# Patient Record
Sex: FEMALE | Race: WHITE | NOT HISPANIC OR LATINO | Employment: FULL TIME | URBAN - METROPOLITAN AREA
[De-identification: names, ages, dates, MRNs, and addresses within clinical notes are randomized per-mention and may not be internally consistent; named-entity substitution may affect disease eponyms.]

---

## 2017-01-06 ENCOUNTER — HISTORICAL (OUTPATIENT)
Dept: INFUSION THERAPY | Facility: HOSPITAL | Age: 37
End: 2017-01-06

## 2017-02-03 ENCOUNTER — HISTORICAL (OUTPATIENT)
Dept: INFUSION THERAPY | Facility: HOSPITAL | Age: 37
End: 2017-02-03

## 2017-03-06 ENCOUNTER — HISTORICAL (OUTPATIENT)
Dept: INFUSION THERAPY | Facility: HOSPITAL | Age: 37
End: 2017-03-06

## 2017-04-03 ENCOUNTER — HISTORICAL (OUTPATIENT)
Dept: INFUSION THERAPY | Facility: HOSPITAL | Age: 37
End: 2017-04-03

## 2017-05-04 ENCOUNTER — HISTORICAL (OUTPATIENT)
Dept: INFUSION THERAPY | Facility: HOSPITAL | Age: 37
End: 2017-05-04

## 2017-05-12 ENCOUNTER — HISTORICAL (OUTPATIENT)
Dept: HEMATOLOGY/ONCOLOGY | Facility: CLINIC | Age: 37
End: 2017-05-12

## 2017-05-12 LAB
ABS NEUT (OLG): 2.45 X10(3)/MCL (ref 2.1–9.2)
ALBUMIN SERPL-MCNC: 4.3 GM/DL (ref 3.4–5)
ALBUMIN/GLOB SERPL: 1.3 {RATIO}
ALP SERPL-CCNC: 111 UNIT/L (ref 38–126)
ALT SERPL-CCNC: 38 UNIT/L (ref 12–78)
AST SERPL-CCNC: 14 UNIT/L (ref 15–37)
BASOPHILS # BLD AUTO: 0 X10(3)/MCL (ref 0–0.2)
BASOPHILS NFR BLD AUTO: 0.5 %
BILIRUB SERPL-MCNC: 0.3 MG/DL (ref 0.2–1)
BILIRUBIN DIRECT+TOT PNL SERPL-MCNC: 0.1 MG/DL (ref 0–0.2)
BILIRUBIN DIRECT+TOT PNL SERPL-MCNC: 0.2 MG/DL (ref 0–0.8)
BUN SERPL-MCNC: 11 MG/DL (ref 7–18)
CALCIUM SERPL-MCNC: 9.8 MG/DL (ref 8.5–10.1)
CHLORIDE SERPL-SCNC: 103 MMOL/L (ref 98–107)
CO2 SERPL-SCNC: 28 MMOL/L (ref 21–32)
CREAT SERPL-MCNC: 0.59 MG/DL (ref 0.55–1.02)
EOSINOPHIL # BLD AUTO: 0.1 X10(3)/MCL (ref 0–0.9)
EOSINOPHIL NFR BLD AUTO: 1.4 %
ERYTHROCYTE [DISTWIDTH] IN BLOOD BY AUTOMATED COUNT: 12.5 % (ref 11.5–17)
GLOBULIN SER-MCNC: 3.2 GM/DL (ref 2.4–3.5)
GLUCOSE SERPL-MCNC: 89 MG/DL (ref 74–106)
HCT VFR BLD AUTO: 38.5 % (ref 37–47)
HGB BLD-MCNC: 13 GM/DL (ref 12–16)
LYMPHOCYTES # BLD AUTO: 1.3 X10(3)/MCL (ref 0.6–4.6)
LYMPHOCYTES NFR BLD AUTO: 29 %
MCH RBC QN AUTO: 29.7 PG (ref 27–31)
MCHC RBC AUTO-ENTMCNC: 33.8 GM/DL (ref 33–36)
MCV RBC AUTO: 87.9 FL (ref 80–94)
MONOCYTES # BLD AUTO: 0.6 X10(3)/MCL (ref 0.1–1.3)
MONOCYTES NFR BLD AUTO: 12.9 %
NEUTROPHILS # BLD AUTO: 2.4 X10(3)/MCL (ref 2.1–9.2)
NEUTROPHILS NFR BLD AUTO: 56.2 %
PLATELET # BLD AUTO: 249 X10(3)/MCL (ref 130–400)
PMV BLD AUTO: 9.8 FL (ref 9.4–12.4)
POTASSIUM SERPL-SCNC: 4 MMOL/L (ref 3.5–5.1)
PROT SERPL-MCNC: 7.5 GM/DL (ref 6.4–8.2)
RBC # BLD AUTO: 4.38 X10(6)/MCL (ref 4.2–5.4)
SODIUM SERPL-SCNC: 140 MMOL/L (ref 136–145)
WBC # SPEC AUTO: 4.4 X10(3)/MCL (ref 4.5–11.5)

## 2017-05-31 ENCOUNTER — HISTORICAL (OUTPATIENT)
Dept: INFUSION THERAPY | Facility: HOSPITAL | Age: 37
End: 2017-05-31

## 2017-06-20 ENCOUNTER — HISTORICAL (OUTPATIENT)
Dept: LAB | Facility: HOSPITAL | Age: 37
End: 2017-06-20

## 2017-06-20 LAB
ABS NEUT (OLG): 2.55 X10(3)/MCL (ref 2.1–9.2)
B-HCG FREE SERPL-ACNC: <1 MIU/ML
BASOPHILS # BLD AUTO: 0 X10(3)/MCL (ref 0–0.2)
BASOPHILS NFR BLD AUTO: 0 %
BUN SERPL-MCNC: 20 MG/DL (ref 7–18)
CALCIUM SERPL-MCNC: 9.4 MG/DL (ref 8.5–10.1)
CHLORIDE SERPL-SCNC: 100 MMOL/L (ref 98–107)
CO2 SERPL-SCNC: 29 MMOL/L (ref 21–32)
CREAT SERPL-MCNC: 0.75 MG/DL (ref 0.55–1.02)
EOSINOPHIL # BLD AUTO: 0.1 X10(3)/MCL (ref 0–0.9)
EOSINOPHIL NFR BLD AUTO: 2 %
ERYTHROCYTE [DISTWIDTH] IN BLOOD BY AUTOMATED COUNT: 12.3 % (ref 11.5–17)
GLUCOSE SERPL-MCNC: 90 MG/DL (ref 74–106)
GROUP & RH: NORMAL
HCT VFR BLD AUTO: 39.3 % (ref 37–47)
HGB BLD-MCNC: 13.2 GM/DL (ref 12–16)
LYMPHOCYTES # BLD AUTO: 1.2 X10(3)/MCL (ref 0.6–4.6)
LYMPHOCYTES NFR BLD AUTO: 28 %
MCH RBC QN AUTO: 29 PG (ref 27–31)
MCHC RBC AUTO-ENTMCNC: 33.6 GM/DL (ref 33–36)
MCV RBC AUTO: 86.4 FL (ref 80–94)
MONOCYTES # BLD AUTO: 0.4 X10(3)/MCL (ref 0.1–1.3)
MONOCYTES NFR BLD AUTO: 9 %
NEUTROPHILS # BLD AUTO: 2.55 X10(3)/MCL (ref 2.1–9.2)
NEUTROPHILS NFR BLD AUTO: 60 %
PLATELET # BLD AUTO: 256 X10(3)/MCL (ref 130–400)
PMV BLD AUTO: 10.1 FL (ref 9.4–12.4)
POTASSIUM SERPL-SCNC: 3.8 MMOL/L (ref 3.5–5.1)
RBC # BLD AUTO: 4.55 X10(6)/MCL (ref 4.2–5.4)
SODIUM SERPL-SCNC: 136 MMOL/L (ref 136–145)
WBC # SPEC AUTO: 4.2 X10(3)/MCL (ref 4.5–11.5)

## 2017-06-22 ENCOUNTER — HISTORICAL (OUTPATIENT)
Dept: ADMINISTRATIVE | Facility: HOSPITAL | Age: 37
End: 2017-06-22

## 2017-06-22 LAB — B-HCG FREE SERPL-ACNC: <1 MIU/ML

## 2017-06-28 ENCOUNTER — HISTORICAL (OUTPATIENT)
Dept: INFUSION THERAPY | Facility: HOSPITAL | Age: 37
End: 2017-06-28

## 2017-08-07 ENCOUNTER — HISTORICAL (OUTPATIENT)
Dept: ADMINISTRATIVE | Facility: HOSPITAL | Age: 37
End: 2017-08-07

## 2017-08-07 LAB
ABS NEUT (OLG): 2.36 X10(3)/MCL (ref 2.1–9.2)
ALBUMIN SERPL-MCNC: 4.2 GM/DL (ref 3.4–5)
ALP SERPL-CCNC: 143 UNIT/L (ref 38–126)
ALT SERPL-CCNC: 37 UNIT/L (ref 12–78)
ANION GAP SERPL CALC-SCNC: 17 MMOL/L
AST SERPL-CCNC: 17 UNIT/L (ref 15–37)
BASOPHILS # BLD AUTO: 0 X10(3)/MCL (ref 0–0.2)
BASOPHILS NFR BLD AUTO: 0.2 %
BILIRUB SERPL-MCNC: 0.3 MG/DL (ref 0.2–1)
BILIRUBIN DIRECT+TOT PNL SERPL-MCNC: 0.1 MG/DL (ref 0–0.2)
BILIRUBIN DIRECT+TOT PNL SERPL-MCNC: 0.2 MG/DL (ref 0–0.8)
BUN SERPL-MCNC: 12 MG/DL (ref 7–18)
CHLORIDE SERPL-SCNC: 101 MMOL/L (ref 98–109)
CREAT SERPL-MCNC: 0.7 MG/DL (ref 0.6–1.3)
EOSINOPHIL # BLD AUTO: 0.1 X10(3)/MCL (ref 0–0.9)
EOSINOPHIL NFR BLD AUTO: 2.2 %
ERYTHROCYTE [DISTWIDTH] IN BLOOD BY AUTOMATED COUNT: 12.3 % (ref 11.5–17)
GLUCOSE SERPL-MCNC: 116 MG/DL (ref 70–105)
HCT VFR BLD AUTO: 39 % (ref 37–47)
HCT VFR BLD CALC: 40 % (ref 38–51)
HGB BLD-MCNC: 13.3 GM/DL (ref 12–16)
HGB BLD-MCNC: 13.6 MG/DL (ref 12–17)
LIVER PROFILE INTERP: ABNORMAL
LYMPHOCYTES # BLD AUTO: 1.2 X10(3)/MCL (ref 0.6–4.6)
LYMPHOCYTES NFR BLD AUTO: 29.2 %
MCH RBC QN AUTO: 29.5 PG (ref 27–31)
MCHC RBC AUTO-ENTMCNC: 34.1 GM/DL (ref 33–36)
MCV RBC AUTO: 86.5 FL (ref 80–94)
MONOCYTES # BLD AUTO: 0.5 X10(3)/MCL (ref 0.1–1.3)
MONOCYTES NFR BLD AUTO: 11.6 %
NEUTROPHILS # BLD AUTO: 2.4 X10(3)/MCL (ref 2.1–9.2)
NEUTROPHILS NFR BLD AUTO: 56.8 %
PLATELET # BLD AUTO: 257 X10(3)/MCL (ref 130–400)
PMV BLD AUTO: 10.1 FL (ref 9.4–12.4)
POC IONIZED CALCIUM: 1.26 MMOL/L (ref 1.12–1.32)
POC TCO2: 28 MMOL/L (ref 22–27)
POTASSIUM BLD-SCNC: 3.7 MMOL/L (ref 3.5–4.9)
PROT SERPL-MCNC: 7.5 GM/DL (ref 6.4–8.2)
RBC # BLD AUTO: 4.51 X10(6)/MCL (ref 4.2–5.4)
SODIUM BLD-SCNC: 141 MMOL/L (ref 138–146)
WBC # SPEC AUTO: 4.2 X10(3)/MCL (ref 4.5–11.5)

## 2017-08-23 ENCOUNTER — HISTORICAL (OUTPATIENT)
Dept: INFUSION THERAPY | Facility: HOSPITAL | Age: 37
End: 2017-08-23

## 2017-10-19 ENCOUNTER — HISTORICAL (OUTPATIENT)
Dept: INFUSION THERAPY | Facility: HOSPITAL | Age: 37
End: 2017-10-19

## 2017-11-22 ENCOUNTER — HISTORICAL (OUTPATIENT)
Dept: ADMINISTRATIVE | Facility: HOSPITAL | Age: 37
End: 2017-11-22

## 2017-11-22 LAB
ABS NEUT (OLG): 3.4 X10(3)/MCL (ref 2.1–9.2)
ALBUMIN SERPL-MCNC: 4.3 GM/DL (ref 3.4–5)
ALBUMIN/GLOB SERPL: 1.3 {RATIO}
ALP SERPL-CCNC: 126 UNIT/L (ref 38–126)
ALT SERPL-CCNC: 56 UNIT/L (ref 12–78)
AST SERPL-CCNC: 50 UNIT/L (ref 15–37)
BASOPHILS # BLD AUTO: 0 X10(3)/MCL (ref 0–0.2)
BASOPHILS NFR BLD AUTO: 0.4 %
BILIRUB SERPL-MCNC: 0.2 MG/DL (ref 0.2–1)
BILIRUBIN DIRECT+TOT PNL SERPL-MCNC: 0.1 MG/DL (ref 0–0.2)
BILIRUBIN DIRECT+TOT PNL SERPL-MCNC: 0.1 MG/DL (ref 0–0.8)
BUN SERPL-MCNC: 17 MG/DL (ref 7–18)
CALCIUM SERPL-MCNC: 9.5 MG/DL (ref 8.5–10.1)
CHLORIDE SERPL-SCNC: 105 MMOL/L (ref 98–107)
CO2 SERPL-SCNC: 27 MMOL/L (ref 21–32)
CREAT SERPL-MCNC: 0.68 MG/DL (ref 0.55–1.02)
EOSINOPHIL # BLD AUTO: 0 X10(3)/MCL (ref 0–0.9)
EOSINOPHIL NFR BLD AUTO: 1 %
ERYTHROCYTE [DISTWIDTH] IN BLOOD BY AUTOMATED COUNT: 12.2 % (ref 11.5–17)
GLOBULIN SER-MCNC: 3.4 GM/DL (ref 2.4–3.5)
GLUCOSE SERPL-MCNC: 91 MG/DL (ref 74–106)
HCT VFR BLD AUTO: 37.8 % (ref 37–47)
HGB BLD-MCNC: 13.1 GM/DL (ref 12–16)
LYMPHOCYTES # BLD AUTO: 1.3 X10(3)/MCL (ref 0.6–4.6)
LYMPHOCYTES NFR BLD AUTO: 25.4 %
MCH RBC QN AUTO: 30.1 PG (ref 27–31)
MCHC RBC AUTO-ENTMCNC: 34.7 GM/DL (ref 33–36)
MCV RBC AUTO: 86.9 FL (ref 80–94)
MONOCYTES # BLD AUTO: 0.4 X10(3)/MCL (ref 0.1–1.3)
MONOCYTES NFR BLD AUTO: 7.7 %
NEUTROPHILS # BLD AUTO: 3.4 X10(3)/MCL (ref 2.1–9.2)
NEUTROPHILS NFR BLD AUTO: 65.5 %
PLATELET # BLD AUTO: 238 X10(3)/MCL (ref 130–400)
PMV BLD AUTO: 9.7 FL (ref 9.4–12.4)
POTASSIUM SERPL-SCNC: 3.7 MMOL/L (ref 3.5–5.1)
PROT SERPL-MCNC: 7.7 GM/DL (ref 6.4–8.2)
RBC # BLD AUTO: 4.35 X10(6)/MCL (ref 4.2–5.4)
SODIUM SERPL-SCNC: 139 MMOL/L (ref 136–145)
WBC # SPEC AUTO: 5.2 X10(3)/MCL (ref 4.5–11.5)

## 2017-12-26 ENCOUNTER — HISTORICAL (OUTPATIENT)
Dept: INFUSION THERAPY | Facility: HOSPITAL | Age: 37
End: 2017-12-26

## 2018-01-09 ENCOUNTER — HISTORICAL (OUTPATIENT)
Dept: ADMINISTRATIVE | Facility: HOSPITAL | Age: 38
End: 2018-01-09

## 2018-01-09 LAB
ABS NEUT (OLG): 4.91 X10(3)/MCL (ref 2.1–9.2)
ALBUMIN SERPL-MCNC: 4.2 GM/DL (ref 3.4–5)
ALP SERPL-CCNC: 154 UNIT/L (ref 38–126)
ALT SERPL-CCNC: 53 UNIT/L (ref 12–78)
ANION GAP SERPL CALC-SCNC: 16 MMOL/L
AST SERPL-CCNC: 28 UNIT/L (ref 15–37)
BASOPHILS # BLD AUTO: 0 X10(3)/MCL (ref 0–0.2)
BASOPHILS NFR BLD AUTO: 0.3 %
BILIRUB SERPL-MCNC: 0.3 MG/DL (ref 0.2–1)
BILIRUBIN DIRECT+TOT PNL SERPL-MCNC: 0.1 MG/DL (ref 0–0.5)
BILIRUBIN DIRECT+TOT PNL SERPL-MCNC: 0.2 MG/DL (ref 0–0.8)
BUN SERPL-MCNC: 14 MG/DL (ref 7–18)
CHLORIDE SERPL-SCNC: 103 MMOL/L (ref 98–109)
CREAT SERPL-MCNC: 0.6 MG/DL (ref 0.6–1.3)
EOSINOPHIL # BLD AUTO: 0.1 X10(3)/MCL (ref 0–0.9)
EOSINOPHIL NFR BLD AUTO: 0.9 %
ERYTHROCYTE [DISTWIDTH] IN BLOOD BY AUTOMATED COUNT: 12.5 % (ref 11.5–17)
GLUCOSE SERPL-MCNC: 144 MG/DL (ref 70–105)
HCT VFR BLD AUTO: 39.4 % (ref 37–47)
HCT VFR BLD CALC: 39 % (ref 38–51)
HGB BLD-MCNC: 13.2 GM/DL (ref 12–16)
HGB BLD-MCNC: 13.3 MG/DL (ref 12–17)
LIVER PROFILE INTERP: ABNORMAL
LYMPHOCYTES # BLD AUTO: 1.6 X10(3)/MCL (ref 0.6–4.6)
LYMPHOCYTES NFR BLD AUTO: 22.3 %
MCH RBC QN AUTO: 29.7 PG (ref 27–31)
MCHC RBC AUTO-ENTMCNC: 33.5 GM/DL (ref 33–36)
MCV RBC AUTO: 88.7 FL (ref 80–94)
MONOCYTES # BLD AUTO: 0.4 X10(3)/MCL (ref 0.1–1.3)
MONOCYTES NFR BLD AUTO: 6.4 %
NEUTROPHILS # BLD AUTO: 4.9 X10(3)/MCL (ref 2.1–9.2)
NEUTROPHILS NFR BLD AUTO: 70.1 %
PLATELET # BLD AUTO: 226 X10(3)/MCL (ref 130–400)
PMV BLD AUTO: 9.7 FL (ref 9.4–12.4)
POC IONIZED CALCIUM: 1.17 MMOL/L (ref 1.12–1.32)
POC TCO2: 27 MMOL/L (ref 22–27)
POTASSIUM BLD-SCNC: 4 MMOL/L (ref 3.5–4.9)
PROT SERPL-MCNC: 7.6 GM/DL (ref 6.4–8.2)
RBC # BLD AUTO: 4.44 X10(6)/MCL (ref 4.2–5.4)
SODIUM BLD-SCNC: 142 MMOL/L (ref 138–146)
WBC # SPEC AUTO: 7 X10(3)/MCL (ref 4.5–11.5)

## 2018-01-19 ENCOUNTER — HISTORICAL (OUTPATIENT)
Dept: RADIOLOGY | Facility: HOSPITAL | Age: 38
End: 2018-01-19

## 2018-05-22 ENCOUNTER — HISTORICAL (OUTPATIENT)
Dept: ADMINISTRATIVE | Facility: HOSPITAL | Age: 38
End: 2018-05-22

## 2018-05-22 LAB
ABS NEUT (OLG): 2.46 X10(3)/MCL (ref 2.1–9.2)
ALBUMIN SERPL-MCNC: 4.2 GM/DL (ref 3.4–5)
ALBUMIN/GLOB SERPL: 1.4 {RATIO}
ALP SERPL-CCNC: 158 UNIT/L (ref 38–126)
ALT SERPL-CCNC: 65 UNIT/L (ref 12–78)
AST SERPL-CCNC: 36 UNIT/L (ref 15–37)
BASOPHILS # BLD AUTO: 0 X10(3)/MCL (ref 0–0.2)
BASOPHILS NFR BLD AUTO: 0.4 %
BILIRUB SERPL-MCNC: 0.4 MG/DL (ref 0.2–1)
BILIRUBIN DIRECT+TOT PNL SERPL-MCNC: 0.1 MG/DL (ref 0–0.2)
BILIRUBIN DIRECT+TOT PNL SERPL-MCNC: 0.3 MG/DL (ref 0–0.8)
BUN SERPL-MCNC: 20 MG/DL (ref 7–18)
CALCIUM SERPL-MCNC: 9.1 MG/DL (ref 8.5–10.1)
CHLORIDE SERPL-SCNC: 106 MMOL/L (ref 98–107)
CO2 SERPL-SCNC: 28 MMOL/L (ref 21–32)
CREAT SERPL-MCNC: 0.91 MG/DL (ref 0.55–1.02)
EOSINOPHIL # BLD AUTO: 0.1 X10(3)/MCL (ref 0–0.9)
EOSINOPHIL NFR BLD AUTO: 1.3 %
ERYTHROCYTE [DISTWIDTH] IN BLOOD BY AUTOMATED COUNT: 11.9 % (ref 11.5–17)
GLOBULIN SER-MCNC: 3.1 GM/DL (ref 2.4–3.5)
GLUCOSE SERPL-MCNC: 95 MG/DL (ref 74–106)
HCT VFR BLD AUTO: 37.8 % (ref 37–47)
HGB BLD-MCNC: 12.9 GM/DL (ref 12–16)
LYMPHOCYTES # BLD AUTO: 1.6 X10(3)/MCL (ref 0.6–4.6)
LYMPHOCYTES NFR BLD AUTO: 35.3 %
MCH RBC QN AUTO: 30 PG (ref 27–31)
MCHC RBC AUTO-ENTMCNC: 34.1 GM/DL (ref 33–36)
MCV RBC AUTO: 87.9 FL (ref 80–94)
MONOCYTES # BLD AUTO: 0.4 X10(3)/MCL (ref 0.1–1.3)
MONOCYTES NFR BLD AUTO: 8.2 %
NEUTROPHILS # BLD AUTO: 2.5 X10(3)/MCL (ref 2.1–9.2)
NEUTROPHILS NFR BLD AUTO: 54.8 %
PLATELET # BLD AUTO: 235 X10(3)/MCL (ref 130–400)
PMV BLD AUTO: 9.7 FL (ref 9.4–12.4)
POTASSIUM SERPL-SCNC: 4.1 MMOL/L (ref 3.5–5.1)
PROT SERPL-MCNC: 7.3 GM/DL (ref 6.4–8.2)
RBC # BLD AUTO: 4.3 X10(6)/MCL (ref 4.2–5.4)
SODIUM SERPL-SCNC: 143 MMOL/L (ref 136–145)
WBC # SPEC AUTO: 4.5 X10(3)/MCL (ref 4.5–11.5)

## 2018-06-12 ENCOUNTER — HISTORICAL (OUTPATIENT)
Dept: INTENSIVE CARE | Facility: HOSPITAL | Age: 38
End: 2018-06-12

## 2018-08-29 ENCOUNTER — HISTORICAL (OUTPATIENT)
Dept: ADMINISTRATIVE | Facility: HOSPITAL | Age: 38
End: 2018-08-29

## 2018-08-29 LAB
ABS NEUT (OLG): 2.32 X10(3)/MCL (ref 2.1–9.2)
ALBUMIN SERPL-MCNC: 4.2 GM/DL (ref 3.4–5)
ALBUMIN/GLOB SERPL: 1.2 RATIO (ref 1.1–2)
ALP SERPL-CCNC: 194 UNIT/L (ref 38–126)
ALT SERPL-CCNC: 60 UNIT/L (ref 12–78)
AST SERPL-CCNC: 33 UNIT/L (ref 15–37)
BASOPHILS # BLD AUTO: 0 X10(3)/MCL (ref 0–0.2)
BASOPHILS NFR BLD AUTO: 0.5 %
BILIRUB SERPL-MCNC: 0.3 MG/DL (ref 0.2–1)
BILIRUBIN DIRECT+TOT PNL SERPL-MCNC: 0.1 MG/DL (ref 0–0.5)
BILIRUBIN DIRECT+TOT PNL SERPL-MCNC: 0.2 MG/DL (ref 0–0.8)
BUN SERPL-MCNC: 19 MG/DL (ref 7–18)
CALCIUM SERPL-MCNC: 9.9 MG/DL (ref 8.5–10.1)
CHLORIDE SERPL-SCNC: 104 MMOL/L (ref 98–107)
CO2 SERPL-SCNC: 27 MMOL/L (ref 21–32)
CREAT SERPL-MCNC: 0.77 MG/DL (ref 0.55–1.02)
EOSINOPHIL # BLD AUTO: 0 X10(3)/MCL (ref 0–0.9)
EOSINOPHIL NFR BLD AUTO: 0.7 %
ERYTHROCYTE [DISTWIDTH] IN BLOOD BY AUTOMATED COUNT: 12.4 % (ref 11.5–17)
GLOBULIN SER-MCNC: 3.5 GM/DL (ref 2.4–3.5)
GLUCOSE SERPL-MCNC: 90 MG/DL (ref 74–106)
HCT VFR BLD AUTO: 42.1 % (ref 37–47)
HGB BLD-MCNC: 14 GM/DL (ref 12–16)
LYMPHOCYTES # BLD AUTO: 1.4 X10(3)/MCL (ref 0.6–4.6)
LYMPHOCYTES NFR BLD AUTO: 33 %
MCH RBC QN AUTO: 30.1 PG (ref 27–31)
MCHC RBC AUTO-ENTMCNC: 33.3 GM/DL (ref 33–36)
MCV RBC AUTO: 90.5 FL (ref 80–94)
MONOCYTES # BLD AUTO: 0.4 X10(3)/MCL (ref 0.1–1.3)
MONOCYTES NFR BLD AUTO: 10.7 %
NEUTROPHILS # BLD AUTO: 2.3 X10(3)/MCL (ref 2.1–9.2)
NEUTROPHILS NFR BLD AUTO: 55.1 %
PLATELET # BLD AUTO: 183 X10(3)/MCL (ref 130–400)
PMV BLD AUTO: 11 FL (ref 9.4–12.4)
POTASSIUM SERPL-SCNC: 4.3 MMOL/L (ref 3.5–5.1)
PROT SERPL-MCNC: 7.7 GM/DL (ref 6.4–8.2)
RBC # BLD AUTO: 4.65 X10(6)/MCL (ref 4.2–5.4)
SODIUM SERPL-SCNC: 140 MMOL/L (ref 136–145)
WBC # SPEC AUTO: 4.2 X10(3)/MCL (ref 4.5–11.5)

## 2018-10-29 ENCOUNTER — HISTORICAL (OUTPATIENT)
Dept: RADIOLOGY | Facility: HOSPITAL | Age: 38
End: 2018-10-29

## 2018-11-14 ENCOUNTER — HISTORICAL (OUTPATIENT)
Dept: PREADMISSION TESTING | Facility: HOSPITAL | Age: 38
End: 2018-11-14

## 2018-11-14 LAB
ABS NEUT (OLG): 5.82 X10(3)/MCL (ref 2.1–9.2)
BASOPHILS # BLD AUTO: 0 X10(3)/MCL (ref 0–0.2)
BASOPHILS NFR BLD AUTO: 0 %
BUN SERPL-MCNC: 11 MG/DL (ref 7–18)
CALCIUM SERPL-MCNC: 9.2 MG/DL (ref 8.5–10.1)
CHLORIDE SERPL-SCNC: 105 MMOL/L (ref 98–107)
CO2 SERPL-SCNC: 28 MMOL/L (ref 21–32)
CREAT SERPL-MCNC: 0.69 MG/DL (ref 0.55–1.02)
CREAT/UREA NIT SERPL: 15.9
EOSINOPHIL # BLD AUTO: 0 X10(3)/MCL (ref 0–0.9)
EOSINOPHIL NFR BLD AUTO: 0 %
ERYTHROCYTE [DISTWIDTH] IN BLOOD BY AUTOMATED COUNT: 11.8 % (ref 11.5–17)
GLUCOSE SERPL-MCNC: 78 MG/DL (ref 74–106)
HCT VFR BLD AUTO: 43.1 % (ref 37–47)
HGB BLD-MCNC: 14.1 GM/DL (ref 12–16)
LYMPHOCYTES # BLD AUTO: 1.4 X10(3)/MCL (ref 0.6–4.6)
LYMPHOCYTES NFR BLD AUTO: 18 %
MCH RBC QN AUTO: 29.8 PG (ref 27–31)
MCHC RBC AUTO-ENTMCNC: 32.7 GM/DL (ref 33–36)
MCV RBC AUTO: 91.1 FL (ref 80–94)
MONOCYTES # BLD AUTO: 0.7 X10(3)/MCL (ref 0.1–1.3)
MONOCYTES NFR BLD AUTO: 8 %
NEUTROPHILS # BLD AUTO: 5.82 X10(3)/MCL (ref 2.1–9.2)
NEUTROPHILS NFR BLD AUTO: 73 %
PLATELET # BLD AUTO: 247 X10(3)/MCL (ref 130–400)
PMV BLD AUTO: 10.5 FL (ref 9.4–12.4)
POTASSIUM SERPL-SCNC: 4.4 MMOL/L (ref 3.5–5.1)
RBC # BLD AUTO: 4.73 X10(6)/MCL (ref 4.2–5.4)
SODIUM SERPL-SCNC: 139 MMOL/L (ref 136–145)
WBC # SPEC AUTO: 8 X10(3)/MCL (ref 4.5–11.5)

## 2018-11-19 ENCOUNTER — HISTORICAL (OUTPATIENT)
Dept: SURGERY | Facility: HOSPITAL | Age: 38
End: 2018-11-19

## 2018-11-29 ENCOUNTER — HISTORICAL (OUTPATIENT)
Dept: HEMATOLOGY/ONCOLOGY | Facility: CLINIC | Age: 38
End: 2018-11-29

## 2018-11-29 LAB
ABS NEUT (OLG): 3.03 X10(3)/MCL (ref 2.1–9.2)
ALBUMIN SERPL-MCNC: 4.2 GM/DL (ref 3.4–5)
ALP SERPL-CCNC: 181 UNIT/L (ref 38–126)
ALT SERPL-CCNC: 67 UNIT/L (ref 12–78)
ANION GAP SERPL CALC-SCNC: 16 MMOL/L
AST SERPL-CCNC: 37 UNIT/L (ref 15–37)
BASOPHILS # BLD AUTO: 0 X10(3)/MCL (ref 0–0.2)
BASOPHILS NFR BLD AUTO: 0.2 %
BILIRUB SERPL-MCNC: 0.3 MG/DL (ref 0.2–1)
BILIRUBIN DIRECT+TOT PNL SERPL-MCNC: 0.1 MG/DL (ref 0–0.2)
BILIRUBIN DIRECT+TOT PNL SERPL-MCNC: 0.2 MG/DL (ref 0–0.8)
BUN SERPL-MCNC: 16 MG/DL (ref 8–26)
CHLORIDE SERPL-SCNC: 102 MMOL/L (ref 98–109)
CREAT SERPL-MCNC: 0.7 MG/DL (ref 0.6–1.3)
EOSINOPHIL # BLD AUTO: 0 X10(3)/MCL (ref 0–0.9)
EOSINOPHIL NFR BLD AUTO: 0.9 %
ERYTHROCYTE [DISTWIDTH] IN BLOOD BY AUTOMATED COUNT: 11.6 % (ref 11.5–17)
GLUCOSE SERPL-MCNC: 100 MG/DL (ref 70–105)
HCT VFR BLD AUTO: 42.4 % (ref 37–47)
HCT VFR BLD CALC: 42 % (ref 38–51)
HGB BLD-MCNC: 13.7 GM/DL (ref 12–16)
HGB BLD-MCNC: 14.3 MG/DL (ref 12–17)
LIVER PROFILE INTERP: ABNORMAL
LYMPHOCYTES # BLD AUTO: 2 X10(3)/MCL (ref 0.6–4.6)
LYMPHOCYTES NFR BLD AUTO: 36.1 %
MCH RBC QN AUTO: 29.3 PG (ref 27–31)
MCHC RBC AUTO-ENTMCNC: 32.3 GM/DL (ref 33–36)
MCV RBC AUTO: 90.6 FL (ref 80–94)
MONOCYTES # BLD AUTO: 0.4 X10(3)/MCL (ref 0.1–1.3)
MONOCYTES NFR BLD AUTO: 7.6 %
NEUTROPHILS # BLD AUTO: 3 X10(3)/MCL (ref 2.1–9.2)
NEUTROPHILS NFR BLD AUTO: 55 %
PLATELET # BLD AUTO: 182 X10(3)/MCL (ref 130–400)
PMV BLD AUTO: 10.8 FL (ref 9.4–12.4)
POC IONIZED CALCIUM: 1.2 MMOL/L (ref 1.12–1.32)
POC TCO2: 27 MMOL/L (ref 24–29)
POTASSIUM BLD-SCNC: 4 MMOL/L (ref 3.5–4.9)
PROT SERPL-MCNC: 7.6 GM/DL (ref 6.4–8.2)
RBC # BLD AUTO: 4.68 X10(6)/MCL (ref 4.2–5.4)
SODIUM BLD-SCNC: 140 MMOL/L (ref 138–146)
WBC # SPEC AUTO: 5.5 X10(3)/MCL (ref 4.5–11.5)

## 2019-07-10 ENCOUNTER — HISTORICAL (OUTPATIENT)
Dept: ADMINISTRATIVE | Facility: HOSPITAL | Age: 39
End: 2019-07-10

## 2019-07-10 LAB
ABS NEUT (OLG): 1.22 X10(3)/MCL (ref 2.1–9.2)
ALBUMIN SERPL-MCNC: 4.4 GM/DL (ref 3.4–5)
ALBUMIN/GLOB SERPL: 1.2 RATIO (ref 1.1–2)
ALP SERPL-CCNC: 129 UNIT/L (ref 38–126)
ALT SERPL-CCNC: 74 UNIT/L (ref 12–78)
AST SERPL-CCNC: 38 UNIT/L (ref 15–37)
BASOPHILS # BLD AUTO: 0 X10(3)/MCL (ref 0–0.2)
BASOPHILS NFR BLD AUTO: 0.6 %
BILIRUB SERPL-MCNC: 0.6 MG/DL (ref 0.2–1)
BILIRUBIN DIRECT+TOT PNL SERPL-MCNC: 0.1 MG/DL (ref 0–0.5)
BILIRUBIN DIRECT+TOT PNL SERPL-MCNC: 0.5 MG/DL (ref 0–0.8)
BUN SERPL-MCNC: 19 MG/DL (ref 7–18)
CALCIUM SERPL-MCNC: 10 MG/DL (ref 8.5–10.1)
CHLORIDE SERPL-SCNC: 103 MMOL/L (ref 98–107)
CO2 SERPL-SCNC: 27 MMOL/L (ref 21–32)
CREAT SERPL-MCNC: 0.93 MG/DL (ref 0.55–1.02)
EOSINOPHIL # BLD AUTO: 0 X10(3)/MCL (ref 0–0.9)
EOSINOPHIL NFR BLD AUTO: 1.3 %
ERYTHROCYTE [DISTWIDTH] IN BLOOD BY AUTOMATED COUNT: 11.3 % (ref 11.5–17)
GLOBULIN SER-MCNC: 3.6 GM/DL (ref 2.4–3.5)
GLUCOSE SERPL-MCNC: 105 MG/DL (ref 74–106)
HCT VFR BLD AUTO: 46.5 % (ref 37–47)
HGB BLD-MCNC: 15.6 GM/DL (ref 12–16)
LYMPHOCYTES # BLD AUTO: 1.6 X10(3)/MCL (ref 0.6–4.6)
LYMPHOCYTES NFR BLD AUTO: 49.5 %
MCH RBC QN AUTO: 28.8 PG (ref 27–31)
MCHC RBC AUTO-ENTMCNC: 33.5 GM/DL (ref 33–36)
MCV RBC AUTO: 86 FL (ref 80–94)
MONOCYTES # BLD AUTO: 0.3 X10(3)/MCL (ref 0.1–1.3)
MONOCYTES NFR BLD AUTO: 9.8 %
NEUTROPHILS # BLD AUTO: 1.2 X10(3)/MCL (ref 2.1–9.2)
NEUTROPHILS NFR BLD AUTO: 38.8 %
PLATELET # BLD AUTO: 212 X10(3)/MCL (ref 130–400)
PMV BLD AUTO: 10.3 FL (ref 9.4–12.4)
POTASSIUM SERPL-SCNC: 3.4 MMOL/L (ref 3.5–5.1)
PROT SERPL-MCNC: 8 GM/DL (ref 6.4–8.2)
RBC # BLD AUTO: 5.41 X10(6)/MCL (ref 4.2–5.4)
SODIUM SERPL-SCNC: 141 MMOL/L (ref 136–145)
WBC # SPEC AUTO: 3.2 X10(3)/MCL (ref 4.5–11.5)

## 2019-07-15 ENCOUNTER — HISTORICAL (OUTPATIENT)
Dept: INFUSION THERAPY | Facility: HOSPITAL | Age: 39
End: 2019-07-15

## 2019-07-31 ENCOUNTER — HISTORICAL (OUTPATIENT)
Dept: HEMATOLOGY/ONCOLOGY | Facility: CLINIC | Age: 39
End: 2019-07-31

## 2019-07-31 LAB
ABS NEUT (OLG): 2.86 X10(3)/MCL (ref 2.1–9.2)
ALBUMIN SERPL-MCNC: 4.5 GM/DL (ref 3.4–5)
ALBUMIN/GLOB SERPL: 1.4 RATIO (ref 1.1–2)
ALP SERPL-CCNC: 120 UNIT/L (ref 38–126)
ALT SERPL-CCNC: 59 UNIT/L (ref 12–78)
AST SERPL-CCNC: 28 UNIT/L (ref 15–37)
BASOPHILS # BLD AUTO: 0 X10(3)/MCL (ref 0–0.2)
BASOPHILS NFR BLD AUTO: 0.4 %
BILIRUB SERPL-MCNC: 0.6 MG/DL (ref 0.2–1)
BILIRUBIN DIRECT+TOT PNL SERPL-MCNC: 0.1 MG/DL (ref 0–0.5)
BILIRUBIN DIRECT+TOT PNL SERPL-MCNC: 0.5 MG/DL (ref 0–0.8)
BUN SERPL-MCNC: 17 MG/DL (ref 7–18)
CALCIUM SERPL-MCNC: 9.6 MG/DL (ref 8.5–10.1)
CHLORIDE SERPL-SCNC: 103 MMOL/L (ref 98–107)
CO2 SERPL-SCNC: 30 MMOL/L (ref 21–32)
CREAT SERPL-MCNC: 0.82 MG/DL (ref 0.55–1.02)
EOSINOPHIL # BLD AUTO: 0 X10(3)/MCL (ref 0–0.9)
EOSINOPHIL NFR BLD AUTO: 1 %
ERYTHROCYTE [DISTWIDTH] IN BLOOD BY AUTOMATED COUNT: 11.6 % (ref 11.5–17)
GLOBULIN SER-MCNC: 3.3 GM/DL (ref 2.4–3.5)
GLUCOSE SERPL-MCNC: 102 MG/DL (ref 74–106)
HCT VFR BLD AUTO: 43.7 % (ref 37–47)
HGB BLD-MCNC: 15 GM/DL (ref 12–16)
LYMPHOCYTES # BLD AUTO: 1.5 X10(3)/MCL (ref 0.6–4.6)
LYMPHOCYTES NFR BLD AUTO: 30.9 %
MCH RBC QN AUTO: 29.1 PG (ref 27–31)
MCHC RBC AUTO-ENTMCNC: 34.3 GM/DL (ref 33–36)
MCV RBC AUTO: 84.9 FL (ref 80–94)
MONOCYTES # BLD AUTO: 0.5 X10(3)/MCL (ref 0.1–1.3)
MONOCYTES NFR BLD AUTO: 9.6 %
NEUTROPHILS # BLD AUTO: 2.9 X10(3)/MCL (ref 2.1–9.2)
NEUTROPHILS NFR BLD AUTO: 58.1 %
PLATELET # BLD AUTO: 283 X10(3)/MCL (ref 130–400)
PMV BLD AUTO: 9.8 FL (ref 9.4–12.4)
POTASSIUM SERPL-SCNC: 3.8 MMOL/L (ref 3.5–5.1)
PROT SERPL-MCNC: 7.8 GM/DL (ref 6.4–8.2)
RBC # BLD AUTO: 5.15 X10(6)/MCL (ref 4.2–5.4)
SODIUM SERPL-SCNC: 141 MMOL/L (ref 136–145)
WBC # SPEC AUTO: 4.9 X10(3)/MCL (ref 4.5–11.5)

## 2020-01-09 ENCOUNTER — HISTORICAL (OUTPATIENT)
Dept: ADMINISTRATIVE | Facility: HOSPITAL | Age: 40
End: 2020-01-09

## 2020-01-09 LAB
ABS NEUT (OLG): 3.37 X10(3)/MCL (ref 2.1–9.2)
ALBUMIN SERPL-MCNC: 4.4 GM/DL (ref 3.4–5)
ALBUMIN/GLOB SERPL: 1.3 RATIO (ref 1.1–2)
ALP SERPL-CCNC: 89 UNIT/L (ref 38–126)
ALT SERPL-CCNC: 59 UNIT/L (ref 12–78)
AST SERPL-CCNC: 28 UNIT/L (ref 15–37)
BASOPHILS # BLD AUTO: 0 X10(3)/MCL (ref 0–0.2)
BASOPHILS NFR BLD AUTO: 0.5 %
BILIRUB SERPL-MCNC: 0.4 MG/DL (ref 0.2–1)
BILIRUBIN DIRECT+TOT PNL SERPL-MCNC: 0.1 MG/DL (ref 0–0.5)
BILIRUBIN DIRECT+TOT PNL SERPL-MCNC: 0.3 MG/DL (ref 0–0.8)
BUN SERPL-MCNC: 18 MG/DL (ref 7–18)
CALCIUM SERPL-MCNC: 10.4 MG/DL (ref 8.5–10.1)
CHLORIDE SERPL-SCNC: 102 MMOL/L (ref 98–107)
CO2 SERPL-SCNC: 30 MMOL/L (ref 21–32)
CREAT SERPL-MCNC: 0.88 MG/DL (ref 0.55–1.02)
EOSINOPHIL # BLD AUTO: 0 X10(3)/MCL (ref 0–0.9)
EOSINOPHIL NFR BLD AUTO: 0.7 %
ERYTHROCYTE [DISTWIDTH] IN BLOOD BY AUTOMATED COUNT: 11.5 % (ref 11.5–17)
GLOBULIN SER-MCNC: 3.5 GM/DL (ref 2.4–3.5)
GLUCOSE SERPL-MCNC: 97 MG/DL (ref 74–106)
HCT VFR BLD AUTO: 42 % (ref 37–47)
HGB BLD-MCNC: 14.3 GM/DL (ref 12–16)
LYMPHOCYTES # BLD AUTO: 1.6 X10(3)/MCL (ref 0.6–4.6)
LYMPHOCYTES NFR BLD AUTO: 28.3 %
MCH RBC QN AUTO: 29.5 PG (ref 27–31)
MCHC RBC AUTO-ENTMCNC: 34 GM/DL (ref 33–36)
MCV RBC AUTO: 86.8 FL (ref 80–94)
MONOCYTES # BLD AUTO: 0.5 X10(3)/MCL (ref 0.1–1.3)
MONOCYTES NFR BLD AUTO: 8.6 %
NEUTROPHILS # BLD AUTO: 3.4 X10(3)/MCL (ref 2.1–9.2)
NEUTROPHILS NFR BLD AUTO: 61.7 %
PLATELET # BLD AUTO: 269 X10(3)/MCL (ref 130–400)
PMV BLD AUTO: 9.5 FL (ref 9.4–12.4)
POTASSIUM SERPL-SCNC: 3.8 MMOL/L (ref 3.5–5.1)
PROT SERPL-MCNC: 7.9 GM/DL (ref 6.4–8.2)
RBC # BLD AUTO: 4.84 X10(6)/MCL (ref 4.2–5.4)
SODIUM SERPL-SCNC: 140 MMOL/L (ref 136–145)
WBC # SPEC AUTO: 5.5 X10(3)/MCL (ref 4.5–11.5)

## 2020-01-10 ENCOUNTER — HISTORICAL (OUTPATIENT)
Dept: INFUSION THERAPY | Facility: HOSPITAL | Age: 40
End: 2020-01-10

## 2020-07-02 ENCOUNTER — HISTORICAL (OUTPATIENT)
Dept: HEMATOLOGY/ONCOLOGY | Facility: CLINIC | Age: 40
End: 2020-07-02

## 2020-07-02 LAB
ABS NEUT (OLG): 2.55 X10(3)/MCL (ref 2.1–9.2)
ALBUMIN SERPL-MCNC: 4.8 GM/DL (ref 3.5–5)
ALBUMIN/GLOB SERPL: 1.6 RATIO (ref 1.1–2)
ALP SERPL-CCNC: 83 UNIT/L (ref 40–150)
ALT SERPL-CCNC: 41 UNIT/L (ref 0–55)
AST SERPL-CCNC: 25 UNIT/L (ref 5–34)
BASOPHILS # BLD AUTO: 0 X10(3)/MCL (ref 0–0.2)
BASOPHILS NFR BLD AUTO: 0.2 %
BILIRUB SERPL-MCNC: 0.9 MG/DL
BILIRUBIN DIRECT+TOT PNL SERPL-MCNC: 0.3 MG/DL (ref 0–0.5)
BILIRUBIN DIRECT+TOT PNL SERPL-MCNC: 0.6 MG/DL (ref 0–0.8)
BUN SERPL-MCNC: 18.7 MG/DL (ref 7–18.7)
CALCIUM SERPL-MCNC: 9.7 MG/DL (ref 8.4–10.2)
CHLORIDE SERPL-SCNC: 101 MMOL/L (ref 98–107)
CO2 SERPL-SCNC: 28 MMOL/L (ref 22–29)
CREAT SERPL-MCNC: 0.81 MG/DL (ref 0.55–1.02)
EOSINOPHIL # BLD AUTO: 0 X10(3)/MCL (ref 0–0.9)
EOSINOPHIL NFR BLD AUTO: 0.7 %
ERYTHROCYTE [DISTWIDTH] IN BLOOD BY AUTOMATED COUNT: 11.4 % (ref 11.5–17)
GLOBULIN SER-MCNC: 3 GM/DL (ref 2.4–3.5)
GLUCOSE SERPL-MCNC: 89 MG/DL (ref 74–100)
HCT VFR BLD AUTO: 39.5 % (ref 37–47)
HGB BLD-MCNC: 13.4 GM/DL (ref 12–16)
LYMPHOCYTES # BLD AUTO: 1.5 X10(3)/MCL (ref 0.6–4.6)
LYMPHOCYTES NFR BLD AUTO: 33 %
MCH RBC QN AUTO: 29.9 PG (ref 27–31)
MCHC RBC AUTO-ENTMCNC: 33.9 GM/DL (ref 33–36)
MCV RBC AUTO: 88.2 FL (ref 80–94)
MONOCYTES # BLD AUTO: 0.4 X10(3)/MCL (ref 0.1–1.3)
MONOCYTES NFR BLD AUTO: 8.1 %
NEUTROPHILS # BLD AUTO: 2.6 X10(3)/MCL (ref 2.1–9.2)
NEUTROPHILS NFR BLD AUTO: 57.8 %
PLATELET # BLD AUTO: 246 X10(3)/MCL (ref 130–400)
PMV BLD AUTO: 9.6 FL (ref 9.4–12.4)
POTASSIUM SERPL-SCNC: 3.9 MMOL/L (ref 3.5–5.1)
PROT SERPL-MCNC: 7.8 GM/DL (ref 6.4–8.3)
RBC # BLD AUTO: 4.48 X10(6)/MCL (ref 4.2–5.4)
SODIUM SERPL-SCNC: 139 MMOL/L (ref 136–145)
WBC # SPEC AUTO: 4.4 X10(3)/MCL (ref 4.5–11.5)

## 2020-07-10 ENCOUNTER — HISTORICAL (OUTPATIENT)
Dept: INFUSION THERAPY | Facility: HOSPITAL | Age: 40
End: 2020-07-10

## 2020-10-30 ENCOUNTER — HISTORICAL (OUTPATIENT)
Dept: RADIOLOGY | Facility: HOSPITAL | Age: 40
End: 2020-10-30

## 2021-01-08 ENCOUNTER — HISTORICAL (OUTPATIENT)
Dept: INFUSION THERAPY | Facility: HOSPITAL | Age: 41
End: 2021-01-08

## 2021-01-08 LAB
ABS NEUT (OLG): 1.7 X10(3)/MCL (ref 2.1–9.2)
ALBUMIN SERPL-MCNC: 4.5 GM/DL (ref 3.5–5)
ALP SERPL-CCNC: 70 UNIT/L (ref 40–150)
ALT SERPL-CCNC: 46 UNIT/L (ref 0–55)
ANION GAP SERPL CALC-SCNC: 13 MMOL/L
AST SERPL-CCNC: 26 UNIT/L (ref 5–34)
BASOPHILS # BLD AUTO: 0 X10(3)/MCL (ref 0–0.2)
BASOPHILS NFR BLD AUTO: 0 %
BILIRUB SERPL-MCNC: 0.4 MG/DL
BILIRUBIN DIRECT+TOT PNL SERPL-MCNC: 0.2 MG/DL (ref 0–0.5)
BILIRUBIN DIRECT+TOT PNL SERPL-MCNC: 0.2 MG/DL (ref 0–0.8)
BUN SERPL-MCNC: 18 MG/DL (ref 8–26)
CHLORIDE SERPL-SCNC: 101 MMOL/L (ref 98–109)
CREAT SERPL-MCNC: 0.7 MG/DL (ref 0.6–1.3)
EOSINOPHIL # BLD AUTO: 0 X10(3)/MCL (ref 0–0.9)
EOSINOPHIL NFR BLD AUTO: 1 %
ERYTHROCYTE [DISTWIDTH] IN BLOOD BY AUTOMATED COUNT: 11.3 % (ref 11.5–17)
GLUCOSE SERPL-MCNC: 90 MG/DL (ref 70–105)
HCT VFR BLD AUTO: 39.8 % (ref 37–47)
HCT VFR BLD CALC: 41 % (ref 38–51)
HGB BLD-MCNC: 13.4 GM/DL (ref 12–16)
HGB BLD-MCNC: 13.9 MG/DL (ref 12–17)
LIVER PROFILE INTERP: NORMAL
LYMPHOCYTES # BLD AUTO: 1.5 X10(3)/MCL (ref 0.6–4.6)
LYMPHOCYTES NFR BLD AUTO: 42 %
MCH RBC QN AUTO: 29.7 PG (ref 27–31)
MCHC RBC AUTO-ENTMCNC: 33.7 GM/DL (ref 33–36)
MCV RBC AUTO: 88.2 FL (ref 80–94)
MONOCYTES # BLD AUTO: 0.4 X10(3)/MCL (ref 0.1–1.3)
MONOCYTES NFR BLD AUTO: 11 %
NEUTROPHILS # BLD AUTO: 1.7 X10(3)/MCL (ref 2.1–9.2)
NEUTROPHILS NFR BLD AUTO: 45 %
PLATELET # BLD AUTO: 253 X10(3)/MCL (ref 130–400)
PMV BLD AUTO: 9.6 FL (ref 9.4–12.4)
POC IONIZED CALCIUM: 1.23 MMOL/L (ref 1.12–1.32)
POC TCO2: 29 MMOL/L (ref 24–29)
POTASSIUM BLD-SCNC: 3.6 MMOL/L (ref 3.5–4.9)
PROT SERPL-MCNC: 7.1 GM/DL (ref 6.4–8.3)
RBC # BLD AUTO: 4.51 X10(6)/MCL (ref 4.2–5.4)
SODIUM BLD-SCNC: 139 MMOL/L (ref 138–146)
WBC # SPEC AUTO: 3.7 X10(3)/MCL (ref 4.5–11.5)

## 2021-07-09 ENCOUNTER — HISTORICAL (OUTPATIENT)
Dept: INFUSION THERAPY | Facility: HOSPITAL | Age: 41
End: 2021-07-09

## 2021-07-09 LAB
ABS NEUT (OLG): 3.91 X10(3)/MCL (ref 2.1–9.2)
ALBUMIN SERPL-MCNC: 4.6 GM/DL (ref 3.5–5)
ALP SERPL-CCNC: 84 UNIT/L (ref 40–150)
ALT SERPL-CCNC: 46 UNIT/L (ref 0–55)
ANION GAP SERPL CALC-SCNC: 19 MMOL/L
AST SERPL-CCNC: 31 UNIT/L (ref 5–34)
BASOPHILS # BLD AUTO: 0 X10(3)/MCL (ref 0–0.2)
BASOPHILS NFR BLD AUTO: 0.3 %
BILIRUB SERPL-MCNC: 0.4 MG/DL
BILIRUBIN DIRECT+TOT PNL SERPL-MCNC: 0.2 MG/DL (ref 0–0.5)
BILIRUBIN DIRECT+TOT PNL SERPL-MCNC: 0.2 MG/DL (ref 0–0.8)
BUN SERPL-MCNC: 17 MG/DL (ref 8–26)
CHLORIDE SERPL-SCNC: 99 MMOL/L (ref 98–109)
CREAT SERPL-MCNC: 0.9 MG/DL (ref 0.6–1.3)
EOSINOPHIL # BLD AUTO: 0 X10(3)/MCL (ref 0–0.9)
EOSINOPHIL NFR BLD AUTO: 0.5 %
ERYTHROCYTE [DISTWIDTH] IN BLOOD BY AUTOMATED COUNT: 11.7 % (ref 11.5–17)
GLUCOSE SERPL-MCNC: 97 MG/DL (ref 70–105)
HCT VFR BLD AUTO: 40.5 % (ref 37–47)
HCT VFR BLD CALC: 37 % (ref 38–51)
HGB BLD-MCNC: 12.6 MG/DL (ref 12–17)
HGB BLD-MCNC: 14 GM/DL (ref 12–16)
LIVER PROFILE INTERP: NORMAL
LYMPHOCYTES # BLD AUTO: 1.5 X10(3)/MCL (ref 0.6–4.6)
LYMPHOCYTES NFR BLD AUTO: 25.4 %
MCH RBC QN AUTO: 29.6 PG (ref 27–31)
MCHC RBC AUTO-ENTMCNC: 34.6 GM/DL (ref 33–36)
MCV RBC AUTO: 85.6 FL (ref 80–94)
MONOCYTES # BLD AUTO: 0.4 X10(3)/MCL (ref 0.1–1.3)
MONOCYTES NFR BLD AUTO: 6.8 %
NEUTROPHILS # BLD AUTO: 3.9 X10(3)/MCL (ref 2.1–9.2)
NEUTROPHILS NFR BLD AUTO: 66.8 %
PLATELET # BLD AUTO: 280 X10(3)/MCL (ref 130–400)
PMV BLD AUTO: 9.7 FL (ref 9.4–12.4)
POC IONIZED CALCIUM: 1.26 MMOL/L (ref 1.12–1.32)
POC TCO2: 26 MMOL/L (ref 24–29)
POTASSIUM BLD-SCNC: 3.7 MMOL/L (ref 3.5–4.9)
PROT SERPL-MCNC: 7.8 GM/DL (ref 6.4–8.3)
RBC # BLD AUTO: 4.73 X10(6)/MCL (ref 4.2–5.4)
SODIUM BLD-SCNC: 139 MMOL/L (ref 138–146)
WBC # SPEC AUTO: 5.9 X10(3)/MCL (ref 4.5–11.5)

## 2022-04-11 ENCOUNTER — HISTORICAL (OUTPATIENT)
Dept: ADMINISTRATIVE | Facility: HOSPITAL | Age: 42
End: 2022-04-11
Payer: COMMERCIAL

## 2022-04-25 VITALS
WEIGHT: 162.94 LBS | DIASTOLIC BLOOD PRESSURE: 85 MMHG | HEIGHT: 62 IN | BODY MASS INDEX: 29.98 KG/M2 | SYSTOLIC BLOOD PRESSURE: 130 MMHG

## 2022-04-30 NOTE — OP NOTE
DATE OF SURGERY:    11/19/2018    SURGEON:  ELLEN Corea MD    PREOPERATIVE DIAGNOSES:    1. Right carpal tunnel syndrome.  2. Right de Quervain tenosynovitis with mass, right forearm.    POSTOPERATIVE DIAGNOSES:    1. Right carpal tunnel syndrome.  2. Right de Quervain tenosynovitis with mass, right forearm.    OPERATIVE PROCEDURE:    1. Right carpal tunnel release.  2. Right de Quervain release.  3. Excision mass, right forearm.    INDICATIONS:  This 38-year-old had the above-mentioned problems.  She had failed conservative therapy and desired operative intervention.  The risks and benefits of the proposed and alternative treatment were explained to the patient.  Questions were solicited and answered.  No assurance given.  Informed consent was obtained.    OPERATION IN DETAIL:  After appropriate operative consents were obtained, patient was taken to the operating room and placed on the operating table in supine position.  Axillary block was induced by Anesthesia without difficulty.  The skin on the right arm was prepped and draped in a sterile manner using ChloraPrep.  After exsanguination with an Esmarch bandage, a previously placed arm tourniquet was elevated.     Attention was turned to the carpal tunnel first.  A standard approach to the carpal tunnel was done.  Palmar fascia was identified.  A small hole was made in the palmar fascia and a [place] elevator was slipped beneath the palmar fascia and the transverse carpal ligament.  Palmar fascia was opened.  Transverse carpal ligament was opened.  Transverse carpal ligament and the wrist was opened under direct visualization.  The wound was thoroughly irrigated.  All bleeders were coagulated.  Wound was closed with interrupted nylon sutures.     Next, attention was turned to the first dorsal compartment.  An incision was made along the wrist crease along the 1st dorsal compartment.  Patient had dense adherent tissue over her 1st dorsal  compartment which was released.  What she felt to be a mass was actually a calcified ridge between 2 tendon sheaths at the 1st dorsal compartment.  Both sheaths were opened and released, and then the bridge of calcified tissue between the 2 was removed down to good bone.  The wound was thoroughly irrigated.  All bleeders were coagulated.  Wound was closed in a layered fashion with subcuticular stitch on the skin.     Next, attention was turned to the ulnar side of the forearm.  An incision was made over a palpable mass.  Careful dissection revealed a lipoma, which was sent to Pathology.  The wound was thoroughly irrigated.  All bleeders were coagulated.  The wound was closed with interrupted nylon sutures.  Sterile dressings were applied and a compressive dressing was applied to all 3 incisions.  The tourniquet was deflated.     Lap count, sponge count correct x2.  Estimated blood loss was minimal.  The patient tolerated the procedure without difficulty and was transferred to the recovery room in stable condition.        ______________________________  ELLEN Corea MD    DALJIT/JUNO  DD:  11/19/2018  Time:  02:08PM  DT:  11/19/2018  Time:  02:23PM  Job #:  [id number]

## 2022-04-30 NOTE — OP NOTE
Patient:   Hilary Cadena            MRN: [id number]            FIN: [id number]               Age:   37 years     Sex:  Female     :  1980   Associated Diagnoses:   None   Author:   Dav Mccauley MD      Operative Note   Preoperative diagnosis:    1. estrogen-receptor positive breast cancer  2. CPP    Postoperative diagnosis:      1. estrogen-receptor positive breast cancer  2. CPP      Operation(s):    1. laparoscopic bilateral salpingo-oophorectomy    Anesthesia: General endotracheal anesthesia    EBL: 10 ml  UOP: 100 ml  IV: 1000 ml    Findings:  Diagnostic laparoscopy revealed normal-appearing bilateral tubes and ovaries, normal-appearing uterus.     Specimens:  1. bilateral tubes and ovaries.    Procedure:    After informed consent  was verified patient was taken to the operating room with IV fluid running. She was then administered general endotracheal anesthesia, and prepped and draped in low lithotomy position using stirrups. The patient's arms were tucked at her sides. A Bowser catheter was placed into the bladder after which a weighted speculum was placed into the posterior fornix of vagina. With the assistance of a Garcia retractor, the anterior lip the cervix was grasped with a Francis tenaculum. The cervix was gently dilated using Hegar dilators and sounded to 10 cm with the uterine sound. Next a 8 cm MAUDE tip was assembled and introduced into the uterine cavity. The MAUDE was instilled approximately 7 cc of air and then the tenaculum was removed from the cervix as well as a weighted speculum from the vagina.  Attention was then turned towards the abdomen where the base of the umbilicus was anesthetized with half percent Marcaine with epinephrine.   A 5 mm vertical skin incision was made with scalpel at Arshad's point (approx 2cm inferior to mid left costal margin) and the Veress needle was placed in the intraperitoneal cavity. Intraperitoneal placement confirmed by instillation of normal  saline and ball test.  The peritoneum was then obtained with CO2 opening pressure was noted to be 8 mmHg. Pressure was initially set at 20 mm of mercury until all 3 trochars were placed. After placement of all trochars, the pressure was reduced to 12mmHg for the remainder of the case.  A 5mm trocar was placed through an anesthestized 5mm incison at the base of the umbilicsu and then a third in the right lower quadrant through an anesthetized 5 mm skin incision at a point approximately  8 cm lateral in a 10° caudad direction from the umbilical incision.     At this point the patient was placed in steep Trendelenburg. The bowel was swept out of the posterior cul-de-sac. Next Trendelenburg was reduced to approximately 20°.     Diagnostic laparoscopy was performed with findings as described above and the course of both ureters was closely observed. Right tube and ovary were identified, and retracted away from the pelvic sidewall. The IP ligament was cauterized and transected using the LigaSure device. The tube was cauterized and transected at the isthmic portion also LigaSure device. Next the uterine ligament was cauterized and transected. Finally the remainder of the ovarian and tubal mesentery was cauterized and transected thereby completely covering the right tube and ovary from anatomic attachments to the pelvic sidewall. The right tube and ovary were then placed in a laparoscopic retrieval bag.   Next the entire sequence was repeated on the left side in symmetric fashion to mobilize and remove the left tube and ovary.  The left tube and ovary were then placed in the same bag. The bag was then easily withdrawn through the 5mm umbilical incision    After hemostasis was confirmed the pelvis was irrigated with warm lactated Ringer's solution.   The patient was taken out of Trendelenburg. Pelvis was again inspected and noted to be hemostatic. Approximately 700 mL of warm lactated Ringer's was left in the pelvic cavity  the close of the case to both minimize postoperative adhesions and pain. Pneumoperitoneum was then evacuated. All trochars were removed from the abdomen.       The skin incisions for all 3 trocar sites were then closed with 4-0 Monocryl in subcuticular fashion. The skin incisions were then sealed with Dermabond. The uterine manipulator was then removed from the vagina, and the Bowser catheter catheter was removed at the close of the case. Instrument sponge and needle counts were correct x2. The patient was extubated and returned to the recovery room awake and in stable condition.    Complications: None

## 2022-07-27 DIAGNOSIS — Z17.0 ESTROGEN RECEPTOR POSITIVE: ICD-10-CM

## 2022-07-27 DIAGNOSIS — C50.812 MALIGNANT NEOPLASM OF OVERLAPPING SITES OF LEFT FEMALE BREAST, UNSPECIFIED ESTROGEN RECEPTOR STATUS: Primary | ICD-10-CM

## 2022-07-27 DIAGNOSIS — D70.0 CONGENITAL NEUTROPENIA: ICD-10-CM

## 2022-07-28 ENCOUNTER — LAB VISIT (OUTPATIENT)
Dept: LAB | Facility: HOSPITAL | Age: 42
End: 2022-07-28
Attending: INTERNAL MEDICINE
Payer: COMMERCIAL

## 2022-07-28 DIAGNOSIS — D70.0 CONGENITAL NEUTROPENIA: ICD-10-CM

## 2022-07-28 DIAGNOSIS — Z17.0 ESTROGEN RECEPTOR POSITIVE: ICD-10-CM

## 2022-07-28 DIAGNOSIS — C50.812 MALIGNANT NEOPLASM OF OVERLAPPING SITES OF LEFT FEMALE BREAST, UNSPECIFIED ESTROGEN RECEPTOR STATUS: ICD-10-CM

## 2022-07-28 LAB
ALBUMIN SERPL-MCNC: 4.8 GM/DL (ref 3.5–5)
ALBUMIN/GLOB SERPL: 1.6 RATIO (ref 1.1–2)
ALP SERPL-CCNC: 96 UNIT/L (ref 40–150)
ALT SERPL-CCNC: 36 UNIT/L (ref 0–55)
AST SERPL-CCNC: 31 UNIT/L (ref 5–34)
BASOPHILS # BLD AUTO: 0.02 X10(3)/MCL (ref 0–0.2)
BASOPHILS NFR BLD AUTO: 0.6 %
BILIRUBIN DIRECT+TOT PNL SERPL-MCNC: 0.6 MG/DL
BUN SERPL-MCNC: 15.6 MG/DL (ref 7–18.7)
CALCIUM SERPL-MCNC: 10.2 MG/DL (ref 8.4–10.2)
CHLORIDE SERPL-SCNC: 102 MMOL/L (ref 98–107)
CO2 SERPL-SCNC: 25 MMOL/L (ref 22–29)
CREAT SERPL-MCNC: 0.98 MG/DL (ref 0.55–1.02)
EOSINOPHIL # BLD AUTO: 0.03 X10(3)/MCL (ref 0–0.9)
EOSINOPHIL NFR BLD AUTO: 0.9 %
ERYTHROCYTE [DISTWIDTH] IN BLOOD BY AUTOMATED COUNT: 11.4 % (ref 11.5–17)
GLOBULIN SER-MCNC: 3 GM/DL (ref 2.4–3.5)
GLUCOSE SERPL-MCNC: 96 MG/DL (ref 74–100)
HCT VFR BLD AUTO: 40.4 % (ref 37–47)
HGB BLD-MCNC: 13.8 GM/DL (ref 12–16)
IMM GRANULOCYTES # BLD AUTO: 0 X10(3)/MCL (ref 0–0.04)
IMM GRANULOCYTES NFR BLD AUTO: 0 %
LYMPHOCYTES # BLD AUTO: 1.29 X10(3)/MCL (ref 0.6–4.6)
LYMPHOCYTES NFR BLD AUTO: 37.9 %
MCH RBC QN AUTO: 29.6 PG (ref 27–31)
MCHC RBC AUTO-ENTMCNC: 34.2 MG/DL (ref 33–36)
MCV RBC AUTO: 86.5 FL (ref 80–94)
MONOCYTES # BLD AUTO: 0.34 X10(3)/MCL (ref 0.1–1.3)
MONOCYTES NFR BLD AUTO: 10 %
NEUTROPHILS # BLD AUTO: 1.7 X10(3)/MCL (ref 2.1–9.2)
NEUTROPHILS NFR BLD AUTO: 50.6 %
PLATELET # BLD AUTO: 257 X10(3)/MCL (ref 130–400)
PMV BLD AUTO: 9.6 FL (ref 7.4–10.4)
POTASSIUM SERPL-SCNC: 3.9 MMOL/L (ref 3.5–5.1)
PROT SERPL-MCNC: 7.8 GM/DL (ref 6.4–8.3)
RBC # BLD AUTO: 4.67 X10(6)/MCL (ref 4.2–5.4)
SODIUM SERPL-SCNC: 138 MMOL/L (ref 136–145)
WBC # SPEC AUTO: 3.4 X10(3)/MCL (ref 4.5–11.5)

## 2022-07-28 PROCEDURE — 80053 COMPREHEN METABOLIC PANEL: CPT

## 2022-07-28 PROCEDURE — 85025 COMPLETE CBC W/AUTO DIFF WBC: CPT

## 2022-07-28 PROCEDURE — 36415 COLL VENOUS BLD VENIPUNCTURE: CPT

## 2022-07-29 PROBLEM — C50.812 MALIGNANT NEOPLASM OF OVERLAPPING SITES OF LEFT BREAST IN FEMALE, ESTROGEN RECEPTOR POSITIVE: Status: ACTIVE | Noted: 2022-07-29

## 2022-07-29 PROBLEM — Z17.0 MALIGNANT NEOPLASM OF OVERLAPPING SITES OF LEFT BREAST IN FEMALE, ESTROGEN RECEPTOR POSITIVE: Status: ACTIVE | Noted: 2022-07-29

## 2022-07-29 NOTE — PROGRESS NOTES
Subjective:       Patient ID: Hilary Cadena is a 42 y.o. female.    Surgeon: Dr. Mariano Weaver  Radiation Oncologist: Dr. Mariano Deshpande     Left Breast Cancer Clinical stage IIIA (T3(m)N1M0) diagnosed 16  Pathologic stage IIIA (T1c(m)N2aM0) s/p surgery 16  Biopsy/histology: Left breast 11:00 US core biopsy--invasive ductal carcinoma, moderately differentiated, focal DCIS, intermediate grade, ER 99.9%, MT 99.9%, Her2 0-1+ by IHC, negative by FISH, Ki67 57%, Left axilla US biopsy positive for carcinoma; right breast 1:00 US core biopsy--cyst wall measuring 0.5cm, usual ductal hyperplasia.  Surgery/pathology:   1. Bilateral mastectomies with ALND on left done 16--right breast no evidence of malignancy, left breast with infiltrating ductal carcinoma, multifocal with 4 separate tumors, largest 2cm, lymphovascular invasion present, tumor infiltrates the nipple, surgical margins free, left axillary nodes with metastatic mammary ductal carcinoma in 4/8 lymph nodes, largest deposit 3.5mm.  2. Prophylactic laparoscopic BSO done by Dr. Mccauley 17--pathology benign.  Imagin. MMG/US bilateral breast 16--11:00 left breast irregular mass with spiculated margins with associated pleomorphic calcifications measures up to 45mm, mass extends to nipple and upper outer quadrant; left node measuring 7mm noted in axillary tail with thickening of cortex, multiple other enlarged lymph nodes left axilla; 1:00 right breast 3mm oval hypoechoic mass with circumscribed margins.  2. PET/CT 3/1/16--Hypermetabolic left breast mass with 2 adjacent hypermetabolic nodes w/n left axilla, no distant metastatic disease.  3. Bilateral Breast MRI w/ and w/o contrast done 3/2/16--large irregular spiculated breast mass centered in 11:00 left breast, measures [id number], 9:00 subareolar left breast there is [id number] oval mass with irregular margins 5mm inferior to other mass; No skin thickening or nipple retraction. No  internal mammary adenopathy, several enlarged lymph nodes seen in left axilla and axillary tail. No MR evidence of malignancy in the right breast.  4. CT C/A/P done 1/19/18--No acute abnormality in C/A/P.  Post-surgical changes of breast reconstruction with mild nodularity right axilla hyperdensity possibly representing calcification, further evaluation is recommended, most likely represents scar but biopsy may be required.  5. MRI brain w/ and w/o contrast done 1/19/18--negative for any abnormality.  6. Right diagnostic MMG/US done at [facility] Breast imaging 1/23/18 showed no evidence of malignancy, area of concern in reconstructed right breast on chest CT corresponds to benign fat necrosis.     2DECHO 3/8/16--EF >55%     Right internal jugular mediport placed 3/10/16--removed 2/2018.     DEXA:  10/13/16--T-score AP spine -1.2, femoral neck 0.0, total hip -0.1 (osteopenia).  10/29/18--T-score AP spine -1.0, femoral neck left -0.4, right -0.3, total hip left 0.3, right 0.4 (normal).  10/30/20--T-score AP spine 0.4, femoral neck left 0.1, right 0.3, total hip left 1.2, right 1.3 (improved-normal).     Treatment history:            1. Neoadjuvant dose dense Adriamycin/Cytoxan x 4 cycles completed 3/10/16--4/22/16           2. Neoadjuvant weekly Taxol completed 5/9/16--7/25/16.           3. Adjuvant radiation completed 10/12/16--11/21/16           4. Zoladex 10/10/16--5/31/17 (stopped s/p BSO 6/22/17).    5. Zometa every 6 months started 7/15/19. Stopped on 7/9/21 because there is not much literature on continuing after 5 years of diagnosis.     Treatment Plan:    Adjuvant Aromasin x 10 years started 10/10/16.    Chief Complaint: Other Misc (Pt reports no new concerns today.)    HPI   Patient presents for follow-up of breast cancer. She is doing well. Continues on Aromasin without any problems. Denies any new complaints. Recent labs all good aside from mildly low WBC. No other problems, overall feeling well. 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     Past Medical History:   Diagnosis Date    Anxiety 6/2022    Cancer 2/2016    Dyspareunia 2020    Hormone disorder 2/2016    Hypertension 6/2020    Menopause 3/2016    Obesity     Osteopenia     Right carpal tunnel syndrome       Review of patient's allergies indicates:  No Known Allergies   Current Outpatient Medications on File Prior to Visit   Medication Sig Dispense Refill    buPROPion (WELLBUTRIN XL) 150 MG TB24 tablet Take 150 mg by mouth every morning.      cetirizine (ZYRTEC) 10 MG tablet Take 10 mg by mouth every morning.      dextroamphetamine-amphetamine (ADDERALL) 20 mg tablet Take 1 tablet by mouth 2 (two) times daily.      exemestane (AROMASIN) 25 mg tablet TAKE ONE TABLET BY MOUTH IN THE MORNING 90 tablet 5    fluticasone propionate (FLONASE) 50 mcg/actuation nasal spray 1 spray by Each Nostril route 2 (two) times a day.      lisinopriL-hydrochlorothiazide (PRINZIDE,ZESTORETIC) 10-12.5 mg per tablet Take 1 tablet by mouth every morning.      loratadine (CLARITIN) 10 mg tablet Take 10 mg by mouth every morning.      NP THYROID 30 mg Tab Take 1 tablet by mouth As instructed. One tablet by mouth early morning on empty stomach       No current facility-administered medications on file prior to visit.      Review of Systems   Constitutional: Negative for appetite change, fatigue, fever and unexpected weight change.   HENT: Negative for mouth sores.    Eyes: Negative.  Negative for visual disturbance.   Respiratory: Negative for cough and shortness of breath.    Cardiovascular: Negative for chest pain and leg swelling.   Gastrointestinal: Negative for abdominal distention, abdominal pain, constipation, diarrhea, nausea, vomiting and reflux.   Genitourinary: Negative for difficulty urinating, dysuria, frequency and hematuria.   Musculoskeletal: Negative for arthralgias and back pain.   Integumentary:  Negative for rash.   Neurological: Negative for  weakness and headaches.   Hematological: Negative for adenopathy.   Psychiatric/Behavioral: Negative for sleep disturbance. The patient is not nervous/anxious.          Vitals:    08/02/22 1457   BP: 131/77   Pulse: 77   Resp: 14   Temp: 98.4 °F (36.9 °C)     Weight: 77.3 kg (170 lb 6.7 oz)      Physical Exam  Constitutional:       Appearance: Normal appearance.   HENT:      Head: Normocephalic.      Nose: Nose normal.      Mouth/Throat:      Mouth: Mucous membranes are moist.   Eyes:      Extraocular Movements: Extraocular movements intact.      Conjunctiva/sclera: Conjunctivae normal.   Cardiovascular:      Rate and Rhythm: Normal rate and regular rhythm.   Pulmonary:      Effort: Pulmonary effort is normal.      Breath sounds: Normal breath sounds.   Chest:      Comments: S/p bilateral mastectomies with reconstruction, nipple tattoos, telangiectasias on left from radiation, no masses in either breast and no axillary adenopathy bilaterally  Abdominal:      General: Bowel sounds are normal. There is no distension.      Palpations: Abdomen is soft.      Tenderness: There is no abdominal tenderness.   Musculoskeletal:         General: Normal range of motion.   Skin:     General: Skin is warm.   Neurological:      General: No focal deficit present.      Mental Status: She is alert and oriented to person, place, and time.   Psychiatric:         Mood and Affect: Mood normal.         Judgment: Judgment normal.         Lab Visit on 07/28/2022   Component Date Value    Sodium Level 07/28/2022 138     Potassium Level 07/28/2022 3.9     Chloride 07/28/2022 102     Carbon Dioxide 07/28/2022 25     Glucose Level 07/28/2022 96     Blood Urea Nitrogen 07/28/2022 15.6     Creatinine 07/28/2022 0.98     Calcium Level Total 07/28/2022 10.2     Protein Total 07/28/2022 7.8     Albumin Level 07/28/2022 4.8     Globulin 07/28/2022 3.0     Albumin/Globulin Ratio 07/28/2022 1.6     Bilirubin Total 07/28/2022 0.6      Alkaline Phosphatase 07/28/2022 96     Alanine Aminotransferase 07/28/2022 36     Aspartate Aminotransfera* 07/28/2022 31     Estimated GFR-Non Maggie* 07/28/2022 >60     WBC 07/28/2022 3.4 (A)    RBC 07/28/2022 4.67     Hgb 07/28/2022 13.8     Hct 07/28/2022 40.4     MCV 07/28/2022 86.5     MCH 07/28/2022 29.6     MCHC 07/28/2022 34.2     RDW 07/28/2022 11.4 (A)    Platelet 07/28/2022 257     MPV 07/28/2022 9.6     Neut % 07/28/2022 50.6     Lymph % 07/28/2022 37.9     Mono % 07/28/2022 10.0     Eos % 07/28/2022 0.9     Basophil % 07/28/2022 0.6     Lymph # 07/28/2022 1.29     Neut # 07/28/2022 1.7 (A)    Mono # 07/28/2022 0.34     Eos # 07/28/2022 0.03     Baso # 07/28/2022 0.02     IG# 07/28/2022 0.00     IG% 07/28/2022 0.0             Assessment:       1. Malignant neoplasm of overlapping sites of left breast in female, estrogen receptor positive         Plan:       Patient with locally advanced left breast cancer clinical stage IIIA (T3N1M0) tumor appeared to be multifocal diagnosed 2/2016.  Patient completed neoadjuvant chemotherapy ddAC and weekly Taxol X 12 7/25/16.  She is s/p bilateral mastecomies with left ALND on 8/31/16 and pathology showed residual disease but treatment response in primary lesion from 5.1cm down to 2cm; also with 4 lymph nodes involved but small metastatic foci.  Adjuvant radiation completed 11/21/16.     Zoladex/Aromasin started on 10/10/16.  Now s/p BSO 6/22/17 and Zoladex stopped. Aromasin only continued.  DEXA 10/13/16 showed ostopenia.  Patient was having new headaches which were concerning and also abdominal fullness & bloating, previously elevated LFTs, right upper chest pain.   CT C/A/P done 1/2018 good aside from area in right axilla may be scar tissue needs further evaluation. Right breast MMG/US confirmed that area was benign fat necrosis only.  MRI brain 1/2018 was also good.  Bilateral MMG christian done [facility] on 8/5/19 benign. No further imaging  recommended.     Currently patient is doing well without any signs or symptoms to suggest recurrence of disease.  Recent labs all good, aside from mildly low WBC count.  Continue Aromasin. Plan is for at least 10 years.   Continue Calcium and vitamin D. Only taking Calcium once daily due to hypercalcemia in the past.   DEXA done 10/30/2020 normal, improved compared to previous.    Patient started on Adjuvant Zometa on 7/15/19. Completed 5 years on 7/9/21. Stopped since there is no evidence that continuing after 5 years from diagnosis has any benefit.  Continue routine surveillance visits.  RTC in 1 year for follow-up.   Will order DEXA for 10/2022. I can call her with results.     Patient on Adderall for fatigue per Dr. Ness which has helped with fatigue.     Patient was told to contact us with any problems before return to clinic.          Renita Sotelo MD

## 2022-08-02 ENCOUNTER — OFFICE VISIT (OUTPATIENT)
Dept: HEMATOLOGY/ONCOLOGY | Facility: CLINIC | Age: 42
End: 2022-08-02
Payer: COMMERCIAL

## 2022-08-02 VITALS
BODY MASS INDEX: 31.36 KG/M2 | WEIGHT: 170.44 LBS | SYSTOLIC BLOOD PRESSURE: 131 MMHG | DIASTOLIC BLOOD PRESSURE: 77 MMHG | HEART RATE: 77 BPM | TEMPERATURE: 98 F | HEIGHT: 62 IN | OXYGEN SATURATION: 100 % | RESPIRATION RATE: 14 BRPM

## 2022-08-02 DIAGNOSIS — C50.812 MALIGNANT NEOPLASM OF OVERLAPPING SITES OF LEFT BREAST IN FEMALE, ESTROGEN RECEPTOR POSITIVE: ICD-10-CM

## 2022-08-02 DIAGNOSIS — Z17.0 MALIGNANT NEOPLASM OF OVERLAPPING SITES OF LEFT BREAST IN FEMALE, ESTROGEN RECEPTOR POSITIVE: ICD-10-CM

## 2022-08-02 PROCEDURE — 4010F ACE/ARB THERAPY RXD/TAKEN: CPT | Mod: CPTII,S$GLB,, | Performed by: INTERNAL MEDICINE

## 2022-08-02 PROCEDURE — 3078F DIAST BP <80 MM HG: CPT | Mod: CPTII,S$GLB,, | Performed by: INTERNAL MEDICINE

## 2022-08-02 PROCEDURE — 3075F PR MOST RECENT SYSTOLIC BLOOD PRESS GE 130-139MM HG: ICD-10-PCS | Mod: CPTII,S$GLB,, | Performed by: INTERNAL MEDICINE

## 2022-08-02 PROCEDURE — 4010F PR ACE/ARB THEARPY RXD/TAKEN: ICD-10-PCS | Mod: CPTII,S$GLB,, | Performed by: INTERNAL MEDICINE

## 2022-08-02 PROCEDURE — 1159F PR MEDICATION LIST DOCUMENTED IN MEDICAL RECORD: ICD-10-PCS | Mod: CPTII,S$GLB,, | Performed by: INTERNAL MEDICINE

## 2022-08-02 PROCEDURE — 1159F MED LIST DOCD IN RCRD: CPT | Mod: CPTII,S$GLB,, | Performed by: INTERNAL MEDICINE

## 2022-08-02 PROCEDURE — 3078F PR MOST RECENT DIASTOLIC BLOOD PRESSURE < 80 MM HG: ICD-10-PCS | Mod: CPTII,S$GLB,, | Performed by: INTERNAL MEDICINE

## 2022-08-02 PROCEDURE — 3008F BODY MASS INDEX DOCD: CPT | Mod: CPTII,S$GLB,, | Performed by: INTERNAL MEDICINE

## 2022-08-02 PROCEDURE — 1160F RVW MEDS BY RX/DR IN RCRD: CPT | Mod: CPTII,S$GLB,, | Performed by: INTERNAL MEDICINE

## 2022-08-02 PROCEDURE — 99214 PR OFFICE/OUTPT VISIT, EST, LEVL IV, 30-39 MIN: ICD-10-PCS | Mod: S$GLB,,, | Performed by: INTERNAL MEDICINE

## 2022-08-02 PROCEDURE — 99999 PR PBB SHADOW E&M-EST. PATIENT-LVL IV: ICD-10-PCS | Mod: PBBFAC,,, | Performed by: INTERNAL MEDICINE

## 2022-08-02 PROCEDURE — 99999 PR PBB SHADOW E&M-EST. PATIENT-LVL IV: CPT | Mod: PBBFAC,,, | Performed by: INTERNAL MEDICINE

## 2022-08-02 PROCEDURE — 3075F SYST BP GE 130 - 139MM HG: CPT | Mod: CPTII,S$GLB,, | Performed by: INTERNAL MEDICINE

## 2022-08-02 PROCEDURE — 3008F PR BODY MASS INDEX (BMI) DOCUMENTED: ICD-10-PCS | Mod: CPTII,S$GLB,, | Performed by: INTERNAL MEDICINE

## 2022-08-02 PROCEDURE — 99214 OFFICE O/P EST MOD 30 MIN: CPT | Mod: S$GLB,,, | Performed by: INTERNAL MEDICINE

## 2022-08-02 PROCEDURE — 1160F PR REVIEW ALL MEDS BY PRESCRIBER/CLIN PHARMACIST DOCUMENTED: ICD-10-PCS | Mod: CPTII,S$GLB,, | Performed by: INTERNAL MEDICINE

## 2022-09-29 ENCOUNTER — DOCUMENTATION ONLY (OUTPATIENT)
Dept: SURGICAL ONCOLOGY | Facility: CLINIC | Age: 42
End: 2022-09-29
Payer: COMMERCIAL

## 2022-09-29 NOTE — PROGRESS NOTES
10-5-21 DR. ANNE MARIE DALY OFFICE NOTE      Chief Complaint     ANNUAL BREAST F/U - BILATERAL MASTECTOMY IN AUGUST 2016    History of Present Illness     Patient reports for follow-up stage III breast cancer status post bilateral mastectomy with reconstruction in 2016.  She presents today without complaint.  No masses or skin changes noted.    Review of Systems     14 point review of systems was performed and was negative except for those pertinent positives and negatives mentioned in the history of present illness    Physical Exam   Vitals & Measurements   HR: 97(Peripheral)  BP: 130/85   HT: 158.00 cm  WT: 73.900 kg  BMI: 29.6     General: Alert and oriented, No acute distress.  Eye: Pupils are equal, round and reactive to light, Extraocular movements are intact, Normal conjunctiva, Vision unchanged.  HENT: Normal hearing, Oral mucosa is moist, No pharyngeal erythema, Ear canals patent, No sinus tenderness.  Neck: Supple, Non-tender, No carotid bruit, No jugular venous distention, No lymphadenopathy, No thyromegaly.  Respiratory: Lungs are clear to auscultation, Respirations are non-labored, Breath sounds are equal, Symmetrical chest wall expansion, No chest wall tenderness.  Cardiovascular: Normal rate, Regular rhythm, No murmur, No gallop, Good pulses equal in all extremities, Normal peripheral perfusion, No edema.  Genitourinary: No costovertebral angle tenderness, No inguinal tenderness, No urethral discharge, No lesions.  Lymphatics: No lymphadenopathy neck, axilla, groin.  Musculoskeletal: Normal range of motion, Normal strength, No tenderness, No swelling, No deformity, Normal gait.  Integumentary: Warm, Pink, Intact, Moist, No pallor, No rash.  Cognition and Speech: Oriented, Speech clear and coherent, Functional cognition intact.   Abdomen: Soft nontender, nondistended, no palpable masses  Breast: No palpable lesions in bilateral reconstructed breast, no skin changes    Assessment/Plan     1. Personal  history of breast cancer Z85.3    No evidence of recurrence   Return to clinic in 1 year    Clinic Follow up, *Est. 10/05/22 3:00:00 CDT, Order for future visit, Personal history of breast cancer    Office/Outpatient Visit Level 4 Established 55279 , Personal history of breast cancer, [facility], 10/05/21 10:45:00 CDT    Clinic Follow up, *Est. 10/05/22 3:00:00 CDT, Order for future visit, Personal history of breast cancer     Problem List/Past Medical History     Ongoing   ER+ (estrogen receptor positive status)    Extremity restricted from procedure    Home CPAP unit    Malignant neoplasm of overlapping sites of left female breast    Mass of right wrist    Obesity    Osteopenia    Pain in right wrist    Radial styloid tenosynovitis of right hand    Right carpal tunnel syndrome    Seasonal allergy    Sleep apnea    Historical   Abdominal tenderness    Abdominoplasty    Anemia    Blurred vision    Breast cancer    Chemotherapy    Fatigue    Headache    Hot flashes    Leukopenia    Menarche    Other chest pain    Pap smear for cervical cancer screening    Pregnant    Radiation oncology AND/OR radiotherapy    Reconstructive Mammoplasty    Seasonal allergies    Procedure/Surgical History   Carpal Tunnel Release (Right) (11/19/2018)  Dequervains (Right) (11/19/2018)  Excision Ganglion (Right) (11/19/2018)  Excision of Right Lower Arm Subcutaneous Tissue and Fascia, Open Approach (11/19/2018)  Excision, tumor, soft tissue of forearm and/or wrist area, subcutaneous; less than 3 cm (11/19/2018)  Incision, extensor tendon sheath, wrist (eg, de Quervains disease) (11/19/2018)  Injection, anesthetic agent; axillary nerve (11/19/2018)  Introduction of Anesthetic Agent into Peripheral Nerves and Plexi, Percutaneous Approach (11/19/2018)  Neuroplasty and/or transposition; median nerve at carpal tunnel (11/19/2018)  Release Median Nerve, Open Approach (11/19/2018)  Release Right Lower Arm and Wrist Tendon, Open  Approach (2018)  Excision of Bilateral Fallopian Tubes, Percutaneous Endoscopic Approach (2017)  Excision of Bilateral Ovaries, Percutaneous Endoscopic Approach (2017)  Laparoscopic Oophorectomy (.) (2017)  Laparoscopy, surgical; with removal of adnexal structures (partial or total oophorectomy and/or salpingectomy) (2017)  Abdominoplasty (2017)  Mammoplasty (2017)  mediport removed (2017)  Mastectomy and axillary clearance (2016)  Bilateral mastectomy with excision of bilateral regional lymph nodes (2016)  Bilateral reconstruction of breasts (2016)  Catheter Insertion Mediport (None) (03/10/2016)  Fluoroscopy of Superior Vena Cava, Guidance (03/10/2016)  Insertion of Infusion Device into Superior Vena Cava, Percutaneous Approach (03/10/2016)  Insertion of tunneled centrally inserted central venous access device, with subcutaneous port; age 5 years or older (03/10/2016)  Insertion of Vascular Access Device into Chest Subcutaneous Tissue and Fascia, Open Approach (03/10/2016)   section (10/02/2014)  Extraction of wisdom tooth ()    Medications     Adderall 20 mg oral tablet, 20 mg= 1 tab(s), Oral, BID    Aromasin 25 mg oral tablet, 25 mg= 1 tab(s), Oral, Daily, 5 refills,   Not taking: DUPLICATE    Caltrate 600 + D, 1 tab(s), Oral, BID    exemestane 25 mg oral tablet, See Instructions, 6 refills    fluticasone 50 mcg/inh inhalation powder, 1 puff(s), INH, BID    hydrochlorothiazide-lisinopril 12.5 mg-10 mg oral tablet, 1 tab(s), Oral, Daily    loratadine 10 mg oral tablet, 10 mg= 1 tab(s), Oral, qAM    Multiple Vitamins oral tablet, 1 tab(s), Oral, Daily    scopolamine 1.5 mg transdermal film, extended release, 1.5 mg= 1 patch(es), TD, Once    thyroid desiccated 30 mg oral tablet, 30 mg= 1 tab(s), Oral, Daily    Allergies     No Known Allergies    Social History     Abuse/Neglect    No, 2021    Alcohol    Current, Beer, Wine, Liquor, 1-2  times per month, 11/19/2018    Employment/School    Unemployed, Work/School description: Homemaker., 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 Living situation: Home/Independent., 04/12/2017    Nutrition/Health    Caffeine intake amount: 3 servings per week., 04/12/2017    Sexually active: Yes., 04/12/2017    Substance Use    Never, 04/12/2017    Tobacco    Former smoker, quit more than 30 days ago, No, 07/16/2021    Family History     Diabetes mellitus type 2: Grandfather.    Gallbladder: Father.    Hypertension.: Mother and Father.    Lung cancer: Grandmother.    Stomach cancer.....: Grandmother.      Result type:  Surgery Office/Clinic Note     Result date:  October 05, 2021 10:45 CDT     Result status:  Auth (Verified)     Result title:  Office Visit Note     Performed by:  Mariano Weaver MD on October 05, 2021 10:46 CDT     Verified by:  Mariano Weaver MD on October 05, 2021 10:46 CDT     Encounter info:  [id number], [facility], Clinic Visit, 10/5/2021 - 10/5/2021

## 2022-10-04 ENCOUNTER — OFFICE VISIT (OUTPATIENT)
Dept: SURGICAL ONCOLOGY | Facility: CLINIC | Age: 42
End: 2022-10-04
Payer: COMMERCIAL

## 2022-10-04 VITALS
BODY MASS INDEX: 31.13 KG/M2 | HEART RATE: 90 BPM | DIASTOLIC BLOOD PRESSURE: 80 MMHG | SYSTOLIC BLOOD PRESSURE: 127 MMHG | WEIGHT: 169.19 LBS | HEIGHT: 62 IN

## 2022-10-04 DIAGNOSIS — C50.812 MALIGNANT NEOPLASM OF OVERLAPPING SITES OF LEFT BREAST IN FEMALE, ESTROGEN RECEPTOR POSITIVE: Primary | ICD-10-CM

## 2022-10-04 DIAGNOSIS — Z17.0 MALIGNANT NEOPLASM OF OVERLAPPING SITES OF LEFT BREAST IN FEMALE, ESTROGEN RECEPTOR POSITIVE: Primary | ICD-10-CM

## 2022-10-04 PROCEDURE — 1159F MED LIST DOCD IN RCRD: CPT | Mod: CPTII,S$GLB,, | Performed by: SURGERY

## 2022-10-04 PROCEDURE — 3079F PR MOST RECENT DIASTOLIC BLOOD PRESSURE 80-89 MM HG: ICD-10-PCS | Mod: CPTII,S$GLB,, | Performed by: SURGERY

## 2022-10-04 PROCEDURE — 3074F SYST BP LT 130 MM HG: CPT | Mod: CPTII,S$GLB,, | Performed by: SURGERY

## 2022-10-04 PROCEDURE — 3079F DIAST BP 80-89 MM HG: CPT | Mod: CPTII,S$GLB,, | Performed by: SURGERY

## 2022-10-04 PROCEDURE — 1159F PR MEDICATION LIST DOCUMENTED IN MEDICAL RECORD: ICD-10-PCS | Mod: CPTII,S$GLB,, | Performed by: SURGERY

## 2022-10-04 PROCEDURE — 4010F PR ACE/ARB THEARPY RXD/TAKEN: ICD-10-PCS | Mod: CPTII,S$GLB,, | Performed by: SURGERY

## 2022-10-04 PROCEDURE — 3074F PR MOST RECENT SYSTOLIC BLOOD PRESSURE < 130 MM HG: ICD-10-PCS | Mod: CPTII,S$GLB,, | Performed by: SURGERY

## 2022-10-04 PROCEDURE — 3008F PR BODY MASS INDEX (BMI) DOCUMENTED: ICD-10-PCS | Mod: CPTII,S$GLB,, | Performed by: SURGERY

## 2022-10-04 PROCEDURE — 3008F BODY MASS INDEX DOCD: CPT | Mod: CPTII,S$GLB,, | Performed by: SURGERY

## 2022-10-04 PROCEDURE — 99999 PR PBB SHADOW E&M-EST. PATIENT-LVL III: CPT | Mod: PBBFAC,,, | Performed by: SURGERY

## 2022-10-04 PROCEDURE — 99213 OFFICE O/P EST LOW 20 MIN: CPT | Mod: S$GLB,,, | Performed by: SURGERY

## 2022-10-04 PROCEDURE — 99999 PR PBB SHADOW E&M-EST. PATIENT-LVL III: ICD-10-PCS | Mod: PBBFAC,,, | Performed by: SURGERY

## 2022-10-04 PROCEDURE — 4010F ACE/ARB THERAPY RXD/TAKEN: CPT | Mod: CPTII,S$GLB,, | Performed by: SURGERY

## 2022-10-04 PROCEDURE — 99213 PR OFFICE/OUTPT VISIT, EST, LEVL III, 20-29 MIN: ICD-10-PCS | Mod: S$GLB,,, | Performed by: SURGERY

## 2022-10-04 NOTE — PROGRESS NOTES
Chief complaint:  Breast cancer     HPI: Patient reports for follow-up stage III breast cancer status post bilateral mastectomy with flap reconstruction in 2016.  She presents today without complaint.  No masses or skin changes noted.  Recent medical oncology note was reviewed        Past Medical and Surgical History  Allergies :   Patient has no known allergies.    @[facility]@  Medical :   She has a past medical history of Anxiety (2022), Cancer (2016), Dyspareunia (), Hormone disorder (2016), Hypertension (2020), Menopause (3/2016), Obesity, Osteopenia, and Right carpal tunnel syndrome.    Surgical :   She has a past surgical history that includes Carpal tunnel release (2018); excision, tumor, soft tissue of forearm and wrist area,subcutaneous;less than 3cm (2018); Oophorectomy (2017); Total Reduction Mammoplasty (2017); mastectomy and axillary clearance  (2016); Bilateral reconstructon of breast (2016);  section; [place] tooth extraction (); Abdominal surgery (2017); Cosmetic surgery (2017); and Breast surgery (2017).     Family History  Her family history includes Cancer in her maternal grandmother; Diabetes in her maternal grandfather and maternal uncle; Gallbladder disease in her father; Hypertension in her father and mother; Migraines in her mother, sister, and sister; Stroke in her paternal aunt and paternal uncle.    Social History  She reports that she has quit smoking. Her smoking use included cigarettes. She has never used smokeless tobacco. She reports current alcohol use. She reports that she does not use drugs.     Review of Systems   Constitutional:  Negative for appetite change, chills, diaphoresis and fever.   HENT:  Negative for congestion, drooling, ear discharge, ear pain and hearing loss.    Eyes:  Negative for discharge.   Respiratory:  Negative for apnea, cough, choking, chest tightness, shortness of breath and stridor.     Cardiovascular:  Negative for chest pain, palpitations and leg swelling.   Endocrine: Negative for cold intolerance and heat intolerance.   Genitourinary:  Negative for difficulty urinating, dyspareunia, dysuria and hematuria.   Musculoskeletal:  Negative for arthralgias, gait problem and joint swelling.   Skin:  Negative for color change and rash.   Neurological:  Negative for dizziness, tremors, seizures, syncope, facial asymmetry, speech difficulty, light-headedness, numbness and headaches.   Psychiatric/Behavioral:  Negative for agitation and confusion.       Objective   Physical Exam  Vitals and nursing note reviewed.   Constitutional:       General: She is not in acute distress.     Appearance: Normal appearance. She is normal weight. She is not ill-appearing, toxic-appearing or diaphoretic.   HENT:      Head: Normocephalic and atraumatic.      Right Ear: External ear normal.      Left Ear: External ear normal.      Nose: Nose normal.      Mouth/Throat:      Mouth: Mucous membranes are moist.      Pharynx: Oropharynx is clear.   Eyes:      General: No scleral icterus.     Extraocular Movements: Extraocular movements intact.      Conjunctiva/sclera: Conjunctivae normal.      Pupils: Pupils are equal, round, and reactive to light.   Cardiovascular:      Rate and Rhythm: Normal rate and regular rhythm.      Pulses: Normal pulses.      Heart sounds: Normal heart sounds. No murmur heard.    No friction rub. No gallop.   Pulmonary:      Effort: Pulmonary effort is normal. No respiratory distress.      Breath sounds: Normal breath sounds. No stridor. No wheezing or rhonchi.   Chest:      Chest wall: No tenderness.   Breasts:     Right: No swelling, bleeding, mass, skin change or tenderness.      Left: No swelling, bleeding, mass, skin change or tenderness.      Comments: Bilateral reconstructed breasts without evidence of recurrence  Abdominal:      General: Abdomen is flat. There is no distension.      Palpations:  "Abdomen is soft. There is no mass.      Tenderness: There is no abdominal tenderness. There is no right CVA tenderness, left CVA tenderness, guarding or rebound.      Hernia: No hernia is present.   Musculoskeletal:         General: No swelling, tenderness, deformity or signs of injury. Normal range of motion.      Cervical back: Normal range of motion and neck supple. No rigidity or tenderness.      Right lower leg: No edema.      Left lower leg: No edema.   Lymphadenopathy:      Cervical: No cervical adenopathy.      Upper Body:      Right upper body: No supraclavicular or axillary adenopathy.      Left upper body: No supraclavicular or axillary adenopathy.   Skin:     General: Skin is warm.      Capillary Refill: Capillary refill takes less than 2 seconds.      Coloration: Skin is not jaundiced or pale.      Findings: No bruising, erythema, lesion or rash.   Neurological:      General: No focal deficit present.      Mental Status: She is alert and oriented to person, place, and time. Mental status is at baseline.      Cranial Nerves: No cranial nerve deficit.      Sensory: No sensory deficit.      Motor: No weakness.      Coordination: Coordination normal.      Gait: Gait normal.   Psychiatric:         Mood and Affect: Mood normal.         Behavior: Behavior normal.         Thought Content: Thought content normal.         Judgment: Judgment normal.     VITAL SIGNS: 24 HR MIN & MAX LAST    @FLOWSTAT(6:24::1)@           @FLOWSTAT(5:24::1)@  127/80     @FLOWSTAT(8:24::1)@  90     @FLOWSTAT(9:24::1)@       @FLOWSTAT(10:24::1)@         HT: 5' 2" (157.5 cm)  WT: 76.7 kg (169 lb 3.2 oz)  BMI: 30.9       Assessment & Plan     No Evidence of disease   Return to clinic in 1 year for physical exam       "

## 2022-11-22 ENCOUNTER — HOSPITAL ENCOUNTER (OUTPATIENT)
Dept: RADIOLOGY | Facility: HOSPITAL | Age: 42
Discharge: HOME | End: 2022-11-22
Attending: INTERNAL MEDICINE
Payer: COMMERCIAL

## 2022-11-22 DIAGNOSIS — C50.812 MALIGNANT NEOPLASM OF OVERLAPPING SITES OF LEFT BREAST IN FEMALE, ESTROGEN RECEPTOR POSITIVE: ICD-10-CM

## 2022-11-22 DIAGNOSIS — Z17.0 MALIGNANT NEOPLASM OF OVERLAPPING SITES OF LEFT BREAST IN FEMALE, ESTROGEN RECEPTOR POSITIVE: ICD-10-CM

## 2022-11-22 PROCEDURE — 77080 DXA BONE DENSITY AXIAL: CPT | Mod: 26,,, | Performed by: STUDENT IN AN ORGANIZED HEALTH CARE EDUCATION/TRAINING PROGRAM

## 2022-11-22 PROCEDURE — 77080 DXA BONE DENSITY AXIAL: CPT | Mod: TC

## 2022-11-22 PROCEDURE — 77080 DEXA BONE DENSITY SPINE HIP: ICD-10-PCS | Mod: 26,,, | Performed by: STUDENT IN AN ORGANIZED HEALTH CARE EDUCATION/TRAINING PROGRAM

## 2022-11-29 ENCOUNTER — TELEPHONE (OUTPATIENT)
Dept: HEMATOLOGY/ONCOLOGY | Facility: CLINIC | Age: 42
End: 2022-11-29
Payer: COMMERCIAL

## 2022-11-29 NOTE — TELEPHONE ENCOUNTER
DEXA remains normal, slightly decreased in a few places, but overall still good.  Patient notified via voicemail.

## 2023-06-10 ENCOUNTER — OFFICE VISIT (OUTPATIENT)
Dept: URGENT CARE | Facility: CLINIC | Age: 43
End: 2023-06-10
Payer: COMMERCIAL

## 2023-06-10 VITALS
BODY MASS INDEX: 29.44 KG/M2 | WEIGHT: 160 LBS | DIASTOLIC BLOOD PRESSURE: 90 MMHG | SYSTOLIC BLOOD PRESSURE: 143 MMHG | RESPIRATION RATE: 18 BRPM | OXYGEN SATURATION: 100 % | TEMPERATURE: 98 F | HEIGHT: 62 IN | HEART RATE: 80 BPM

## 2023-06-10 DIAGNOSIS — J02.0 STREP PHARYNGITIS: ICD-10-CM

## 2023-06-10 DIAGNOSIS — R09.81 SINUS CONGESTION: Primary | ICD-10-CM

## 2023-06-10 LAB
CTP QC/QA: YES
MOLECULAR STREP A: POSITIVE
POC MOLECULAR INFLUENZA A AGN: NEGATIVE
POC MOLECULAR INFLUENZA B AGN: NEGATIVE
SARS-COV-2 RDRP RESP QL NAA+PROBE: NEGATIVE

## 2023-06-10 PROCEDURE — 96372 PR INJECTION,THERAP/PROPH/DIAG2ST, IM OR SUBCUT: ICD-10-PCS | Mod: ,,, | Performed by: PHYSICIAN ASSISTANT

## 2023-06-10 PROCEDURE — 87635 SARS-COV-2 COVID-19 AMP PRB: CPT | Mod: QW,,, | Performed by: PHYSICIAN ASSISTANT

## 2023-06-10 PROCEDURE — 96372 THER/PROPH/DIAG INJ SC/IM: CPT | Mod: ,,, | Performed by: PHYSICIAN ASSISTANT

## 2023-06-10 PROCEDURE — 99204 OFFICE O/P NEW MOD 45 MIN: CPT | Mod: 25,,, | Performed by: PHYSICIAN ASSISTANT

## 2023-06-10 PROCEDURE — 87502 POCT INFLUENZA A/B MOLECULAR: ICD-10-PCS | Mod: QW,,, | Performed by: PHYSICIAN ASSISTANT

## 2023-06-10 PROCEDURE — 87651 POCT STREP A MOLECULAR: ICD-10-PCS | Mod: QW,,, | Performed by: PHYSICIAN ASSISTANT

## 2023-06-10 PROCEDURE — 99204 PR OFFICE/OUTPT VISIT, NEW, LEVL IV, 45-59 MIN: ICD-10-PCS | Mod: 25,,, | Performed by: PHYSICIAN ASSISTANT

## 2023-06-10 PROCEDURE — 87651 STREP A DNA AMP PROBE: CPT | Mod: QW,,, | Performed by: PHYSICIAN ASSISTANT

## 2023-06-10 PROCEDURE — 87502 INFLUENZA DNA AMP PROBE: CPT | Mod: QW,,, | Performed by: PHYSICIAN ASSISTANT

## 2023-06-10 PROCEDURE — 87635: ICD-10-PCS | Mod: QW,,, | Performed by: PHYSICIAN ASSISTANT

## 2023-06-10 RX ORDER — DEXAMETHASONE SODIUM PHOSPHATE 100 MG/10ML
10 INJECTION INTRAMUSCULAR; INTRAVENOUS
Status: COMPLETED | OUTPATIENT
Start: 2023-06-10 | End: 2023-06-10

## 2023-06-10 RX ORDER — CALCIUM CARBONATE/VITAMIN D3 600MG-5MCG
TABLET ORAL
COMMUNITY

## 2023-06-10 RX ORDER — CHLORHEXIDINE GLUCONATE ORAL RINSE 1.2 MG/ML
15 SOLUTION DENTAL 2 TIMES DAILY
Qty: 118 ML | Refills: 0 | Status: SHIPPED | OUTPATIENT
Start: 2023-06-10 | End: 2023-06-17

## 2023-06-10 RX ORDER — AMOXICILLIN 875 MG/1
875 TABLET, FILM COATED ORAL 2 TIMES DAILY
Qty: 20 TABLET | Refills: 0 | Status: SHIPPED | OUTPATIENT
Start: 2023-06-10 | End: 2023-06-20

## 2023-06-10 RX ADMIN — DEXAMETHASONE SODIUM PHOSPHATE 10 MG: 100 INJECTION INTRAMUSCULAR; INTRAVENOUS at 09:06

## 2023-06-10 NOTE — PATIENT INSTRUCTIONS
Positive strep, negative COVID, negative influenza testing today.    Recommend amoxicillin antibiotic coverage for strep throat infection.  Recommend alternate Tylenol and ibuprofen every 4-6 hours as needed for aches pains fever or chills.  Recommend decongestant antihistamine nasal spray as needed for upper respiratory symptoms.  Recommend saltwater gargles pectin throat lozenge Chloraseptic spray or Peridex oral antiseptic gargle and spit if needed for sore throat temporary relief.  Recommend follow-up with primary care physician in 1 week for re-evaluation if not improving.

## 2023-06-10 NOTE — PROGRESS NOTES
"Subjective:      Patient ID: Hilary Cadena is a 43 y.o. female.    Vitals:  height is 5' 2" (1.575 m) and weight is 72.6 kg (160 lb). Her oral temperature is 97.8 °F (36.6 °C). Her blood pressure is 143/90 (abnormal) and her pulse is 80. Her respiration is 18 and oxygen saturation is 100%.     Chief Complaint: Sore Throat (Sore throat, nasal congestion, cough x 1 week )    HPI  patient reports in the past week having persistent throat pain while on vacation in [facility].  Patient reports unable to be seen by urgent clinic out of state reports flying home last night having nasal congestion postnasal drip and persistent sore throat presents to urgent Care for initial evaluation.   Sore Throat     Additional comments: Sore throat, nasal congestion, cough x 1 week       Constitution: Negative for chills, fatigue and fever.   HENT:  Positive for congestion, postnasal drip, sinus pressure and sore throat. Negative for ear pain, sinus pain, trouble swallowing and voice change.    Neck: Negative for neck swelling.   Cardiovascular: Negative.    Respiratory:  Positive for cough. Negative for shortness of breath, stridor and wheezing.    Gastrointestinal: Negative.    Musculoskeletal:  Negative for muscle ache.   Skin: Negative.  Negative for erythema.   Allergic/Immunologic: Negative.    Neurological:  Negative for headaches and altered mental status.   Psychiatric/Behavioral:  Negative for altered mental status.     Objective:     Physical Exam   Constitutional: She is oriented to person, place, and time. She appears well-developed. She is cooperative.  Non-toxic appearance.      Comments:Awake alert pleasant ambulatory female     HENT:   Head: Normocephalic.   Ears:   Right Ear: Hearing, tympanic membrane, external ear and ear canal normal.   Left Ear: Hearing, tympanic membrane, external ear and ear canal normal.   Nose: Congestion present. No mucosal edema, rhinorrhea or nasal deformity. No epistaxis. Right sinus " exhibits no maxillary sinus tenderness and no frontal sinus tenderness. Left sinus exhibits no maxillary sinus tenderness and no frontal sinus tenderness.      Comments: Mild  Mouth/Throat: Uvula is midline and mucous membranes are normal. Mucous membranes are moist. No trismus in the jaw. Normal dentition. No uvula swelling. Posterior oropharyngeal erythema present. No oropharyngeal exudate or posterior oropharyngeal edema.      Comments: 2+ edema bilaterally no uvula swelling no uvula shift no trismus no drooling no muffled voice  Eyes: Conjunctivae and lids are normal. No scleral icterus.   Neck: Trachea normal and phonation normal. Neck supple. No edema present. No erythema present. No neck rigidity present.   Cardiovascular: Normal rate, regular rhythm, normal heart sounds and normal pulses.   No murmur heard.Exam reveals no gallop.   Pulmonary/Chest: Effort normal and breath sounds normal. No respiratory distress. She has no decreased breath sounds. She has no wheezes. She has no rhonchi. She has no rales.   Musculoskeletal: Normal range of motion.         General: Normal range of motion.      Cervical back: She exhibits no tenderness.   Lymphadenopathy:     She has cervical adenopathy.   Neurological: no focal deficit. She is alert and oriented to person, place, and time. No cranial nerve deficit. She exhibits normal muscle tone.   Skin: Skin is warm, dry, intact, not diaphoretic, not pale and no rash. No erythema   Psychiatric: Her speech is normal and behavior is normal. Mood, judgment and thought content normal.   Nursing note and vitals reviewed.       Previous History      Review of patient's allergies indicates:   Allergen Reactions    Allergen ext-aspergillus,mixed Tinitus    Cladosporium cladosporioides allergenic extract Tinitus    Dog hair standardized allergenic extract Tinitus    Grass pollen-bahia Tinitus    Grass pollen-bermuda, standard Tinitus    Mite-dermatophagoides farinae, standardized  Tinitus    Mite-dermatophagoides pteronyssinus, standardized Tinitus    Stock ragweed pollen mixture Tinitus    Tree pollen-american elm Tinitus    Tree pollen-pecan Tinitus    Tree pollen-red maple Tinitus    [place] pollen-lambsquarters Tinitus    Weed pollen-rough pigweed Tinitus       Past Medical History:   Diagnosis Date    Anxiety 2022    Cancer 2016    Dyspareunia     Hormone disorder 2016    Hypertension 2020    Menopause 3/2016    Obesity     Osteopenia     Right carpal tunnel syndrome      Current Outpatient Medications   Medication Instructions    amoxicillin (AMOXIL) 875 mg, Oral, 2 times daily    buPROPion (WELLBUTRIN XL) 150 mg, Oral, Every morning    calcium-vitamin D tablet 600 mg-200 units 1 tablet Orally daily in am    cetirizine (ZYRTEC) 10 mg, Oral, Every morning    chlorhexidine (PERIDEX) 0.12 % solution 15 mLs, Mouth/Throat, 2 times daily    dextroamphetamine-amphetamine (ADDERALL) 20 mg tablet 1 tablet, Oral, 2 times daily    exemestane (AROMASIN) 25 mg tablet TAKE ONE TABLET BY MOUTH IN THE MORNING    fluticasone propionate (FLONASE) 50 mcg/actuation nasal spray 1 spray, Each Nostril, 2 times daily    lisinopriL-hydrochlorothiazide (PRINZIDE,ZESTORETIC) 10-12.5 mg per tablet 1 tablet, Oral, Every morning    multivit with minerals/lutein (MULTIVITAMIN 50 PLUS ORAL) 1 tab po Daily    NP THYROID 30 mg Tab 1 tablet, Oral, See admin instructions, One tablet by mouth early morning on empty stomach     Past Surgical History:   Procedure Laterality Date    ABDOMINAL SURGERY  2017    Bilateral reconstructon of breast  2016    BREAST SURGERY  2017    CARPAL TUNNEL RELEASE  2018     SECTION      COSMETIC SURGERY  2017    excision, tumor, soft tissue of forearm and wrist area,subcutaneous;less than 3cm  2018    mastectomy and axillary clearance   2016    OOPHORECTOMY  2017    TOTAL REDUCTION MAMMOPLASTY  2017    WISDOM TOOTH EXTRACTION  1998  "    Family History   Problem Relation Age of Onset    Hypertension Father     Gallbladder disease Father     Hypertension Mother     Migraines Mother     Diabetes Maternal Grandfather     Cancer Maternal Grandmother     Diabetes Maternal Uncle     Migraines Sister     Migraines Sister     Stroke Paternal Aunt     Stroke Paternal Uncle        Social History     Tobacco Use    Smoking status: Former     Types: Cigarettes    Smokeless tobacco: Never   Substance Use Topics    Alcohol use: Yes     Comment: I dont drink weekly.    Drug use: Never        Physical Exam      Vital Signs Reviewed   BP (!) 143/90   Pulse 80   Temp 97.8 °F (36.6 °C) (Oral)   Resp 18   Ht 5' 2" (1.575 m)   Wt 72.6 kg (160 lb)   LMP  (Approximate)   SpO2 100%   BMI 29.26 kg/m²        Procedures    Procedures     Labs     Results for orders placed or performed in visit on 06/10/23   POCT COVID-19 Rapid Screening   Result Value Ref Range    POC Rapid COVID Negative Negative     Acceptable Yes    POCT Influenza A/B MOLECULAR   Result Value Ref Range    POC Molecular Influenza A Ag Negative Negative, Not Reported    POC Molecular Influenza B Ag Negative Negative, Not Reported     Acceptable Yes    POCT Strep A, Molecular   Result Value Ref Range    Molecular Strep A, POC Positive (A) Negative     Acceptable Yes        Assessment:     1. Sinus congestion    2. Strep pharyngitis        Plan:     Positive strep, negative COVID, negative influenza testing today.    Recommend amoxicillin antibiotic coverage for strep throat infection.  Recommend alternate Tylenol and ibuprofen every 4-6 hours as needed for aches pains fever or chills.  Recommend decongestant antihistamine nasal spray as needed for upper respiratory symptoms.  Recommend saltwater gargles pectin throat lozenge Chloraseptic spray or Peridex oral antiseptic gargle and spit if needed for sore throat temporary relief.  Recommend follow-up " with primary care physician in 1 week for re-evaluation if not improving.  Sinus congestion  -     POCT COVID-19 Rapid Screening  -     POCT Influenza A/B MOLECULAR  -     POCT Strep A, Molecular    Strep pharyngitis    Other orders  -     dexAMETHasone injection 10 mg  -     amoxicillin (AMOXIL) 875 MG tablet; Take 1 tablet (875 mg total) by mouth 2 (two) times daily. for 10 days  Dispense: 20 tablet; Refill: 0  -     chlorhexidine (PERIDEX) 0.12 % solution; Use as directed 15 mLs in the mouth or throat 2 (two) times daily. for 7 days  Dispense: 118 mL; Refill: 0

## 2023-08-01 DIAGNOSIS — C50.819 MALIGNANT NEOPLASM OF OVERLAPPING SITES OF UNSPECIFIED FEMALE BREAST: ICD-10-CM

## 2023-08-01 RX ORDER — EXEMESTANE 25 MG/1
25 TABLET ORAL EVERY MORNING
Qty: 30 TABLET | Refills: 1 | Status: SHIPPED | OUTPATIENT
Start: 2023-08-01 | End: 2023-08-30

## 2023-08-30 DIAGNOSIS — C50.819 MALIGNANT NEOPLASM OF OVERLAPPING SITES OF UNSPECIFIED FEMALE BREAST: ICD-10-CM

## 2023-08-30 RX ORDER — EXEMESTANE 25 MG/1
25 TABLET ORAL EVERY MORNING
Qty: 30 TABLET | Refills: 1 | Status: SHIPPED | OUTPATIENT
Start: 2023-08-30 | End: 2023-11-01

## 2023-09-28 DIAGNOSIS — C50.812 MALIGNANT NEOPLASM OF OVERLAPPING SITES OF LEFT FEMALE BREAST, UNSPECIFIED ESTROGEN RECEPTOR STATUS: ICD-10-CM

## 2023-09-28 DIAGNOSIS — D70.0 CONGENITAL NEUTROPENIA: ICD-10-CM

## 2023-09-28 DIAGNOSIS — Z17.0 MALIGNANT NEOPLASM OF OVERLAPPING SITES OF LEFT BREAST IN FEMALE, ESTROGEN RECEPTOR POSITIVE: Primary | ICD-10-CM

## 2023-09-28 DIAGNOSIS — C50.812 MALIGNANT NEOPLASM OF OVERLAPPING SITES OF LEFT BREAST IN FEMALE, ESTROGEN RECEPTOR POSITIVE: Primary | ICD-10-CM

## 2023-09-28 DIAGNOSIS — Z17.0 ESTROGEN RECEPTOR POSITIVE: ICD-10-CM

## 2023-10-03 PROBLEM — G56.00 CARPAL TUNNEL SYNDROME: Status: ACTIVE | Noted: 2023-10-03

## 2023-10-03 PROBLEM — G47.30 SLEEP APNEA: Status: ACTIVE | Noted: 2023-10-03

## 2023-10-03 PROBLEM — M65.4 RADIAL STYLOID TENOSYNOVITIS OF RIGHT HAND: Status: ACTIVE | Noted: 2023-10-03

## 2023-10-03 PROBLEM — M85.80 OSTEOPENIA: Status: ACTIVE | Noted: 2023-10-03

## 2023-10-03 PROBLEM — C50.819 OVERLAPPING MALIGNANT NEOPLASM OF FEMALE BREAST: Status: ACTIVE | Noted: 2022-07-29

## 2023-10-03 NOTE — PROGRESS NOTES
Subjective:       Patient ID: Hilary Cadena is a 43 y.o. female.    Surgeon: Dr. Mariano Weaver  Radiation Oncologist: Dr. Mariano Deshpande     Left Breast Cancer Clinical stage IIIA (T3(m)N1M0) diagnosed 16  Pathologic stage IIIA (T1c(m)N2aM0) s/p surgery 16  Biopsy/histology: Left breast 11:00 US core biopsy--invasive ductal carcinoma, moderately differentiated, focal DCIS, intermediate grade, ER 99.9%, AR 99.9%, Her2 0-1+ by IHC, negative by FISH, Ki67 57%, Left axilla US biopsy positive for carcinoma; right breast 1:00 US core biopsy--cyst wall measuring 0.5cm, usual ductal hyperplasia.  Surgery/pathology:   1. Bilateral mastectomies with ALND on left done 16--right breast no evidence of malignancy, left breast with infiltrating ductal carcinoma, multifocal with 4 separate tumors, largest 2cm, lymphovascular invasion present, tumor infiltrates the nipple, surgical margins free, left axillary nodes with metastatic mammary ductal carcinoma in 4/8 lymph nodes, largest deposit 3.5mm.  2. Prophylactic laparoscopic BSO done by Dr. Mccauley 17--pathology benign.  Imagin. MMG/US bilateral breast 16--11:00 left breast irregular mass with spiculated margins with associated pleomorphic calcifications measures up to 45mm, mass extends to nipple and upper outer quadrant; left node measuring 7mm noted in axillary tail with thickening of cortex, multiple other enlarged lymph nodes left axilla; 1:00 right breast 3mm oval hypoechoic mass with circumscribed margins.  2. PET/CT 3/1/16--Hypermetabolic left breast mass with 2 adjacent hypermetabolic nodes w/n left axilla, no distant metastatic disease.  3. Bilateral Breast MRI w/ and w/o contrast done 3/2/16--large irregular spiculated breast mass centered in 11:00 left breast, measures [id number], 9:00 subareolar left breast there is [id number] oval mass with irregular margins 5mm inferior to other mass; No skin thickening or nipple retraction. No  internal mammary adenopathy, several enlarged lymph nodes seen in left axilla and axillary tail. No MR evidence of malignancy in the right breast.  4. CT C/A/P done 1/19/18--No acute abnormality in C/A/P.  Post-surgical changes of breast reconstruction with mild nodularity right axilla hyperdensity possibly representing calcification, further evaluation is recommended, most likely represents scar but biopsy may be required.  5. MRI brain w/ and w/o contrast done 1/19/18--negative for any abnormality.  6. Right diagnostic MMG/US done at [facility] 1/23/18 showed no evidence of malignancy, area of concern in reconstructed right breast on chest CT corresponds to benign fat necrosis.     2DECHO 3/8/16--EF >55%     Right internal jugular mediport placed 3/10/16--removed 2/2018.     DEXA:  10/13/16--T-score AP spine -1.2, femoral neck 0.0, total hip -0.1 (osteopenia).  10/29/18--T-score AP spine -1.0, femoral neck left -0.4, right -0.3, total hip left 0.3, right 0.4 (normal).  10/30/20--T-score AP spine 0.4, femoral neck left 0.1, right 0.3, total hip left 1.2, right 1.3 (improved-normal).  11/29/22--AP spine 0.1, left femoral neck 0.1, left total 1.0, right femoral neck 0.1, total right 0.9. Normal bone density.      Treatment history:            1. Neoadjuvant dose dense Adriamycin/Cytoxan x 4 cycles completed 3/10/16--4/22/16           2. Neoadjuvant weekly Taxol completed 5/9/16--7/25/16.           3. Adjuvant radiation completed 10/12/16--11/21/16           4. Zoladex 10/10/16--5/31/17 (stopped s/p BSO 6/22/17).    5. Zometa every 6 months started 7/15/19. Stopped on 7/9/21 because there is not much literature on continuing after 5 years of diagnosis.     Treatment Plan:    Adjuvant Aromasin x 10 years started 10/10/16.    Chief Complaint: Other Misc (Pt reports no new concerns today.)    HPI   Patient presents for follow-up of breast cancer. She is doing well. Continues on Aromasin without any problems.  Denies any new complaints. Recent labs all good. DEXA in November 2022 shows continued normal bone density. No new problems reported.      Past Medical History:   Diagnosis Date    Anemia     Anxiety 6/2022    Breast cancer 2016    Left    Cancer 2/2016    Dyspareunia 2020    Estrogen receptor positive status (ER+)     Hormone disorder 2/2016    Hypertension 6/2020    Menopause 3/2016    Obesity     Osteopenia     Primary central sleep apnea     Right carpal tunnel syndrome     Vaginal dryness       Review of patient's allergies indicates:   Allergen Reactions    Allergen ext-aspergillus,mixed Tinitus    Cladosporium cladosporioides allergenic extract Tinitus    Dog hair standardized allergenic extract Tinitus    Grass pollen-bahia Tinitus    Grass pollen-bermuda, standard Tinitus    Mite-dermatophagoides farinae, standardized Tinitus    Mite-dermatophagoides pteronyssinus, standardized Tinitus    Stock ragweed pollen mixture Tinitus    Tree pollen-american elm Tinitus    Tree pollen-pecan Tinitus    Tree pollen-red maple Tinitus    [place] pollen-lambsquarters Tinitus    Weed pollen-rough pigweed Tinitus      Current Outpatient Medications on File Prior to Visit   Medication Sig Dispense Refill    buPROPion (WELLBUTRIN XL) 150 MG TB24 tablet Take 150 mg by mouth every morning.      calcium-vitamin D tablet 600 mg-200 units 1 tablet Orally daily in am      cetirizine (ZYRTEC) 10 MG tablet Take 10 mg by mouth every morning.      dextroamphetamine-amphetamine (ADDERALL) 20 mg tablet 1 tablet Orally Twice a day for 90 days      doxycycline (PERIOSTAT) 20 MG tablet Take 20 mg by mouth 2 (two) times daily.      exemestane (AROMASIN) 25 mg tablet TAKE ONE TABLET BY MOUTH IN THE MORNING 30 tablet 1    fluticasone propionate (FLONASE) 50 mcg/actuation nasal spray 1 spray in each nostril Nasally bid for 30 day(s)      lisinopriL-hydrochlorothiazide (PRINZIDE,ZESTORETIC) 10-12.5 mg per tablet Take 1 tablet by mouth every  morning.      multivit with minerals/lutein (MULTIVITAMIN 50 PLUS ORAL) 1 tab po Daily      NP THYROID 30 mg Tab Take 1 tablet by mouth As instructed. One tablet by mouth early morning on empty stomach       No current facility-administered medications on file prior to visit.      Review of Systems   Constitutional:  Negative for appetite change, fatigue, fever and unexpected weight change.   HENT:  Negative for mouth sores.    Eyes: Negative.  Negative for visual disturbance.   Respiratory:  Negative for cough and shortness of breath.    Cardiovascular:  Negative for chest pain and leg swelling.   Gastrointestinal:  Negative for abdominal distention, abdominal pain, constipation, diarrhea, nausea, vomiting and reflux.   Genitourinary:  Negative for difficulty urinating, dysuria, frequency and hematuria.   Musculoskeletal:  Negative for arthralgias and back pain.   Integumentary:  Negative for rash.   Neurological:  Negative for weakness and headaches.   Hematological:  Negative for adenopathy.   Psychiatric/Behavioral:  Negative for sleep disturbance. The patient is not nervous/anxious.          Vitals:    10/11/23 0925   BP: 122/83   Pulse: 82   Resp: 14   Temp: 98.3 °F (36.8 °C)     Weight: 77.8 kg (171 lb 9.6 oz)   Physical Exam  Constitutional:       Appearance: Normal appearance.   HENT:      Head: Normocephalic.      Nose: Nose normal.      Mouth/Throat:      Mouth: Mucous membranes are moist.   Eyes:      Extraocular Movements: Extraocular movements intact.      Conjunctiva/sclera: Conjunctivae normal.   Cardiovascular:      Rate and Rhythm: Normal rate and regular rhythm.   Pulmonary:      Effort: Pulmonary effort is normal.      Breath sounds: Normal breath sounds.   Chest:      Comments: S/p bilateral mastectomies with reconstruction, nipple tattoos, telangiectasias on left from radiation, no masses in either breast and no axillary adenopathy bilaterally  Abdominal:      General: Bowel sounds are normal.  There is no distension.      Palpations: Abdomen is soft.      Tenderness: There is no abdominal tenderness.   Musculoskeletal:         General: Normal range of motion.   Skin:     General: Skin is warm.   Neurological:      General: No focal deficit present.      Mental Status: She is alert and oriented to person, place, and time.   Psychiatric:         Mood and Affect: Mood normal.         Judgment: Judgment normal.       No visits with results within 1 Week(s) from this visit.   Latest known visit with results is:   Lab Visit on 10/04/2023   Component Date Value    Sodium Level 10/04/2023 141     Potassium Level 10/04/2023 3.7     Chloride 10/04/2023 102     Carbon Dioxide 10/04/2023 27     Glucose Level 10/04/2023 92     Blood Urea Nitrogen 10/04/2023 16.4     Creatinine 10/04/2023 0.86     Calcium Level Total 10/04/2023 10.2     Protein Total 10/04/2023 7.8     Albumin Level 10/04/2023 4.6     Globulin 10/04/2023 3.2     Albumin/Globulin Ratio 10/04/2023 1.4     Bilirubin Total 10/04/2023 0.4     Alkaline Phosphatase 10/04/2023 104     Alanine Aminotransferase 10/04/2023 28     Aspartate Aminotransfera* 10/04/2023 23     eGFR 10/04/2023 >60     WBC 10/04/2023 5.22     RBC 10/04/2023 4.81     Hgb 10/04/2023 14.1     Hct 10/04/2023 42.0     MCV 10/04/2023 87.3     MCH 10/04/2023 29.3     MCHC 10/04/2023 33.6     RDW 10/04/2023 11.4 (L)     Platelet 10/04/2023 286     MPV 10/04/2023 9.6     Neut % 10/04/2023 51.0     Lymph % 10/04/2023 38.7     Mono % 10/04/2023 8.0     Eos % 10/04/2023 1.5     Basophil % 10/04/2023 0.6     Lymph # 10/04/2023 2.02     Neut # 10/04/2023 2.66     Mono # 10/04/2023 0.42     Eos # 10/04/2023 0.08     Baso # 10/04/2023 0.03     IG# 10/04/2023 0.01     IG% 10/04/2023 0.2           Assessment:       1. Malignant neoplasm of overlapping sites of left breast in female, estrogen receptor positive         Plan:       Patient with locally advanced left breast cancer clinical stage IIIA  (T3N1M0) tumor appeared to be multifocal diagnosed 2/2016.  Patient completed neoadjuvant chemotherapy ddAC and weekly Taxol X 12 7/25/16.  She is s/p bilateral mastecomies with left ALND on 8/31/16 and pathology showed residual disease but treatment response in primary lesion from 5.1cm down to 2cm; also with 4 lymph nodes involved but small metastatic foci.  Adjuvant radiation completed 11/21/16.     Zoladex/Aromasin started on 10/10/16.  Now s/p BSO 6/22/17 and Zoladex stopped. Aromasin only continued.  DEXA 10/13/16 showed ostopenia.  Patient was having new headaches which were concerning and also abdominal fullness & bloating, previously elevated LFTs, right upper chest pain.   CT C/A/P done 1/2018 good aside from area in right axilla may be scar tissue needs further evaluation. Right breast MMG/US confirmed that area was benign fat necrosis only.  MRI brain 1/2018 was also good.  Bilateral MMG christian done at [facility] on 8/5/19 benign. No further imaging recommended.     Currently patient is doing well without any signs or symptoms to suggest recurrence of disease.  Recent labs all good.  Continue Aromasin. Plan is for at least 10 years.   Continue Calcium and vitamin D. Only taking Calcium once daily due to hypercalcemia in the past.     Patient started on Adjuvant Zometa on 7/15/19. Completed 5 years on 7/9/21. Stopped since there is no evidence that continuing after 5 years from diagnosis has any benefit.  DEXA done 11/29/22 remains normal.   Continue routine surveillance visits.  RTC in 1 year for follow-up.     Patient on Adderall for fatigue per Dr. Ness which has helped with fatigue.  Patient was told to contact us with any problems before return to clinic.          Fernando Dias, LINETTE

## 2023-10-04 ENCOUNTER — OFFICE VISIT (OUTPATIENT)
Dept: SURGICAL ONCOLOGY | Facility: CLINIC | Age: 43
End: 2023-10-04
Payer: COMMERCIAL

## 2023-10-04 VITALS
WEIGHT: 171.38 LBS | HEIGHT: 62 IN | HEART RATE: 71 BPM | DIASTOLIC BLOOD PRESSURE: 82 MMHG | BODY MASS INDEX: 31.54 KG/M2 | SYSTOLIC BLOOD PRESSURE: 124 MMHG

## 2023-10-04 DIAGNOSIS — Z17.0 MALIGNANT NEOPLASM OF OVERLAPPING SITES OF LEFT BREAST IN FEMALE, ESTROGEN RECEPTOR POSITIVE: Primary | ICD-10-CM

## 2023-10-04 DIAGNOSIS — C50.812 MALIGNANT NEOPLASM OF OVERLAPPING SITES OF LEFT BREAST IN FEMALE, ESTROGEN RECEPTOR POSITIVE: Primary | ICD-10-CM

## 2023-10-04 PROCEDURE — 1159F PR MEDICATION LIST DOCUMENTED IN MEDICAL RECORD: ICD-10-PCS | Mod: CPTII,S$GLB,, | Performed by: SURGERY

## 2023-10-04 PROCEDURE — 4010F PR ACE/ARB THEARPY RXD/TAKEN: ICD-10-PCS | Mod: CPTII,S$GLB,, | Performed by: SURGERY

## 2023-10-04 PROCEDURE — 3074F SYST BP LT 130 MM HG: CPT | Mod: CPTII,S$GLB,, | Performed by: SURGERY

## 2023-10-04 PROCEDURE — 99999 PR PBB SHADOW E&M-EST. PATIENT-LVL III: ICD-10-PCS | Mod: PBBFAC,,, | Performed by: SURGERY

## 2023-10-04 PROCEDURE — 3074F PR MOST RECENT SYSTOLIC BLOOD PRESSURE < 130 MM HG: ICD-10-PCS | Mod: CPTII,S$GLB,, | Performed by: SURGERY

## 2023-10-04 PROCEDURE — 99214 PR OFFICE/OUTPT VISIT, EST, LEVL IV, 30-39 MIN: ICD-10-PCS | Mod: S$GLB,,, | Performed by: SURGERY

## 2023-10-04 PROCEDURE — 3079F PR MOST RECENT DIASTOLIC BLOOD PRESSURE 80-89 MM HG: ICD-10-PCS | Mod: CPTII,S$GLB,, | Performed by: SURGERY

## 2023-10-04 PROCEDURE — 4010F ACE/ARB THERAPY RXD/TAKEN: CPT | Mod: CPTII,S$GLB,, | Performed by: SURGERY

## 2023-10-04 PROCEDURE — 99999 PR PBB SHADOW E&M-EST. PATIENT-LVL III: CPT | Mod: PBBFAC,,, | Performed by: SURGERY

## 2023-10-04 PROCEDURE — 1159F MED LIST DOCD IN RCRD: CPT | Mod: CPTII,S$GLB,, | Performed by: SURGERY

## 2023-10-04 PROCEDURE — 3008F BODY MASS INDEX DOCD: CPT | Mod: CPTII,S$GLB,, | Performed by: SURGERY

## 2023-10-04 PROCEDURE — 99214 OFFICE O/P EST MOD 30 MIN: CPT | Mod: S$GLB,,, | Performed by: SURGERY

## 2023-10-04 PROCEDURE — 3008F PR BODY MASS INDEX (BMI) DOCUMENTED: ICD-10-PCS | Mod: CPTII,S$GLB,, | Performed by: SURGERY

## 2023-10-04 PROCEDURE — 3079F DIAST BP 80-89 MM HG: CPT | Mod: CPTII,S$GLB,, | Performed by: SURGERY

## 2023-10-04 NOTE — PROGRESS NOTES
Chief complaint:  Breast cancer     HPI: Patient reports for follow-up stage III breast cancer status post bilateral mastectomy with flap reconstruction in 2016.  She presents today without complaint.  No masses or skin changes noted.  Recent medical oncology note was reviewed        Past Medical and Surgical History  Allergies :   Allergen ext-aspergillus,mixed; Cladosporium cladosporioides allergenic extract; Dog hair standardized allergenic extract; Grass pollen-bahia; Grass pollen-bermuda, standard; Mite-dermatophagoides farinae, standardized; Mite-dermatophagoides pteronyssinus, standardized; Stock ragweed pollen mixture; Tree pollen-american elm; Tree pollen-pecan; Tree pollen-red maple; Weed pollen-lambsquarters; and Weed pollen-rough pigweed    @[email or web address]@  Medical :   She has a past medical history of Anemia, Anxiety (2022), Breast cancer (), Cancer (2016), Dyspareunia (), Estrogen receptor positive status (ER+), Hormone disorder (2016), Hypertension (2020), Menopause (3/2016), Obesity, Osteopenia, Primary central sleep apnea, Right carpal tunnel syndrome, and Vaginal dryness.    Surgical :   She has a past surgical history that includes Carpal tunnel release (2018); excision, tumor, soft tissue of forearm and wrist area,subcutaneous;less than 3cm (2018); Oophorectomy (Bilateral, 2017); Total Reduction Mammoplasty (2017); mastectomy and axillary clearance  (Bilateral, 2016); Bilateral reconstructon of breast (2016);  section (); [place] tooth extraction (); Abdominal surgery; Cosmetic surgery (2017); Breast surgery (2017); and Abdominoplasty (2017).     Family History  Her family history includes Alcohol abuse in her maternal grandfather and maternal uncle; Arthritis in her maternal grandmother; Cancer in her maternal grandmother; Diabetes in her maternal grandfather and maternal uncle; Gallbladder disease in her father; Hypertension  in her father and mother; Migraines in her mother, sister, and sister; Stroke in her maternal grandfather, paternal aunt, and paternal uncle.    Social History  She reports that she quit smoking about 12 years ago. Her smoking use included cigarettes. She has never used smokeless tobacco. She reports current alcohol use. She reports that she does not use drugs.     Review of Systems   Constitutional:  Negative for appetite change, chills, diaphoresis and fever.   HENT:  Negative for congestion, drooling, ear discharge, ear pain and hearing loss.    Eyes:  Negative for discharge.   Respiratory:  Negative for apnea, cough, choking, chest tightness, shortness of breath and stridor.    Cardiovascular:  Negative for chest pain, palpitations and leg swelling.   Endocrine: Negative for cold intolerance and heat intolerance.   Genitourinary:  Negative for difficulty urinating, dyspareunia, dysuria and hematuria.   Musculoskeletal:  Negative for arthralgias, gait problem and joint swelling.   Skin:  Negative for color change and rash.   Neurological:  Negative for dizziness, tremors, seizures, syncope, facial asymmetry, speech difficulty, light-headedness, numbness and headaches.   Psychiatric/Behavioral:  Negative for agitation and confusion.         Objective   Physical Exam  Vitals and nursing note reviewed.   Constitutional:       General: She is not in acute distress.     Appearance: Normal appearance. She is normal weight. She is not ill-appearing, toxic-appearing or diaphoretic.   HENT:      Head: Normocephalic and atraumatic.      Right Ear: External ear normal.      Left Ear: External ear normal.      Nose: Nose normal.      Mouth/Throat:      Mouth: Mucous membranes are moist.      Pharynx: Oropharynx is clear.   Eyes:      General: No scleral icterus.     Extraocular Movements: Extraocular movements intact.      Conjunctiva/sclera: Conjunctivae normal.      Pupils: Pupils are equal, round, and reactive to light.    Cardiovascular:      Rate and Rhythm: Normal rate and regular rhythm.      Pulses: Normal pulses.      Heart sounds: Normal heart sounds. No murmur heard.     No friction rub. No gallop.   Pulmonary:      Effort: Pulmonary effort is normal. No respiratory distress.      Breath sounds: Normal breath sounds. No stridor. No wheezing or rhonchi.   Chest:      Chest wall: No tenderness.   Breasts:     Right: No swelling, bleeding, mass, skin change or tenderness.      Left: No swelling, bleeding, mass, skin change or tenderness.      Comments: Bilateral reconstructed breasts without evidence of recurrence  Abdominal:      General: Abdomen is flat. There is no distension.      Palpations: Abdomen is soft. There is no mass.      Tenderness: There is no abdominal tenderness. There is no right CVA tenderness, left CVA tenderness, guarding or rebound.      Hernia: No hernia is present.   Musculoskeletal:         General: No swelling, tenderness, deformity or signs of injury. Normal range of motion.      Cervical back: Normal range of motion and neck supple. No rigidity or tenderness.      Right lower leg: No edema.      Left lower leg: No edema.   Lymphadenopathy:      Cervical: No cervical adenopathy.      Upper Body:      Right upper body: No supraclavicular or axillary adenopathy.      Left upper body: No supraclavicular or axillary adenopathy.   Skin:     General: Skin is warm.      Capillary Refill: Capillary refill takes less than 2 seconds.      Coloration: Skin is not jaundiced or pale.      Findings: No bruising, erythema, lesion or rash.   Neurological:      General: No focal deficit present.      Mental Status: She is alert and oriented to person, place, and time. Mental status is at baseline.      Cranial Nerves: No cranial nerve deficit.      Sensory: No sensory deficit.      Motor: No weakness.      Coordination: Coordination normal.      Gait: Gait normal.   Psychiatric:         Mood and Affect: Mood normal.   "       Behavior: Behavior normal.         Thought Content: Thought content normal.         Judgment: Judgment normal.       VITAL SIGNS: 24 HR MIN & MAX LAST    @FLOWSTAT(6:24::1)@           @FLOWSTAT(5:24::1)@  124/82     @FLOWSTAT(8:24::1)@  71     @FLOWSTAT(9:24::1)@       @FLOWSTAT(10:24::1)@         HT: 5' 2" (157.5 cm)  WT: 77.7 kg (171 lb 6.4 oz)  BMI: 31.3       Assessment & Plan     No Evidence of disease   Return to clinic in 1 year for physical exam     "

## 2023-10-11 ENCOUNTER — OFFICE VISIT (OUTPATIENT)
Dept: HEMATOLOGY/ONCOLOGY | Facility: CLINIC | Age: 43
End: 2023-10-11
Payer: COMMERCIAL

## 2023-10-11 VITALS
BODY MASS INDEX: 31.58 KG/M2 | DIASTOLIC BLOOD PRESSURE: 83 MMHG | SYSTOLIC BLOOD PRESSURE: 122 MMHG | HEIGHT: 62 IN | OXYGEN SATURATION: 100 % | HEART RATE: 82 BPM | TEMPERATURE: 98 F | RESPIRATION RATE: 14 BRPM | WEIGHT: 171.63 LBS

## 2023-10-11 DIAGNOSIS — C50.812 MALIGNANT NEOPLASM OF OVERLAPPING SITES OF LEFT BREAST IN FEMALE, ESTROGEN RECEPTOR POSITIVE: Primary | ICD-10-CM

## 2023-10-11 DIAGNOSIS — Z17.0 MALIGNANT NEOPLASM OF OVERLAPPING SITES OF LEFT BREAST IN FEMALE, ESTROGEN RECEPTOR POSITIVE: Primary | ICD-10-CM

## 2023-10-11 PROCEDURE — 3074F SYST BP LT 130 MM HG: CPT | Mod: CPTII,S$GLB,, | Performed by: NURSE PRACTITIONER

## 2023-10-11 PROCEDURE — 3079F DIAST BP 80-89 MM HG: CPT | Mod: CPTII,S$GLB,, | Performed by: NURSE PRACTITIONER

## 2023-10-11 PROCEDURE — 4010F PR ACE/ARB THEARPY RXD/TAKEN: ICD-10-PCS | Mod: CPTII,S$GLB,, | Performed by: NURSE PRACTITIONER

## 2023-10-11 PROCEDURE — 1160F RVW MEDS BY RX/DR IN RCRD: CPT | Mod: CPTII,S$GLB,, | Performed by: NURSE PRACTITIONER

## 2023-10-11 PROCEDURE — 99999 PR PBB SHADOW E&M-EST. PATIENT-LVL IV: ICD-10-PCS | Mod: PBBFAC,,, | Performed by: NURSE PRACTITIONER

## 2023-10-11 PROCEDURE — 99999 PR PBB SHADOW E&M-EST. PATIENT-LVL IV: CPT | Mod: PBBFAC,,, | Performed by: NURSE PRACTITIONER

## 2023-10-11 PROCEDURE — 3079F PR MOST RECENT DIASTOLIC BLOOD PRESSURE 80-89 MM HG: ICD-10-PCS | Mod: CPTII,S$GLB,, | Performed by: NURSE PRACTITIONER

## 2023-10-11 PROCEDURE — 99213 OFFICE O/P EST LOW 20 MIN: CPT | Mod: S$GLB,,, | Performed by: NURSE PRACTITIONER

## 2023-10-11 PROCEDURE — 1160F PR REVIEW ALL MEDS BY PRESCRIBER/CLIN PHARMACIST DOCUMENTED: ICD-10-PCS | Mod: CPTII,S$GLB,, | Performed by: NURSE PRACTITIONER

## 2023-10-11 PROCEDURE — 3008F PR BODY MASS INDEX (BMI) DOCUMENTED: ICD-10-PCS | Mod: CPTII,S$GLB,, | Performed by: NURSE PRACTITIONER

## 2023-10-11 PROCEDURE — 1159F PR MEDICATION LIST DOCUMENTED IN MEDICAL RECORD: ICD-10-PCS | Mod: CPTII,S$GLB,, | Performed by: NURSE PRACTITIONER

## 2023-10-11 PROCEDURE — 4010F ACE/ARB THERAPY RXD/TAKEN: CPT | Mod: CPTII,S$GLB,, | Performed by: NURSE PRACTITIONER

## 2023-10-11 PROCEDURE — 1159F MED LIST DOCD IN RCRD: CPT | Mod: CPTII,S$GLB,, | Performed by: NURSE PRACTITIONER

## 2023-10-11 PROCEDURE — 99213 PR OFFICE/OUTPT VISIT, EST, LEVL III, 20-29 MIN: ICD-10-PCS | Mod: S$GLB,,, | Performed by: NURSE PRACTITIONER

## 2023-10-11 PROCEDURE — 3008F BODY MASS INDEX DOCD: CPT | Mod: CPTII,S$GLB,, | Performed by: NURSE PRACTITIONER

## 2023-10-11 PROCEDURE — 3074F PR MOST RECENT SYSTOLIC BLOOD PRESSURE < 130 MM HG: ICD-10-PCS | Mod: CPTII,S$GLB,, | Performed by: NURSE PRACTITIONER

## 2023-10-11 RX ORDER — DOXYCYCLINE HYCLATE 20 MG
20 TABLET ORAL 2 TIMES DAILY
COMMUNITY
Start: 2023-09-05

## 2023-11-01 DIAGNOSIS — C50.819 MALIGNANT NEOPLASM OF OVERLAPPING SITES OF UNSPECIFIED FEMALE BREAST: ICD-10-CM

## 2023-11-01 RX ORDER — EXEMESTANE 25 MG/1
25 TABLET ORAL EVERY MORNING
Qty: 30 TABLET | Refills: 11 | Status: SHIPPED | OUTPATIENT
Start: 2023-11-01

## 2023-11-14 ENCOUNTER — OFFICE VISIT (OUTPATIENT)
Dept: URGENT CARE | Facility: CLINIC | Age: 43
End: 2023-11-14
Payer: COMMERCIAL

## 2023-11-14 VITALS
RESPIRATION RATE: 20 BRPM | TEMPERATURE: 99 F | HEART RATE: 96 BPM | SYSTOLIC BLOOD PRESSURE: 126 MMHG | HEIGHT: 62 IN | OXYGEN SATURATION: 98 % | DIASTOLIC BLOOD PRESSURE: 85 MMHG | WEIGHT: 170 LBS | BODY MASS INDEX: 31.28 KG/M2

## 2023-11-14 DIAGNOSIS — J32.9 SINUSITIS, UNSPECIFIED CHRONICITY, UNSPECIFIED LOCATION: Primary | ICD-10-CM

## 2023-11-14 DIAGNOSIS — R09.81 SINUS CONGESTION: ICD-10-CM

## 2023-11-14 LAB
CTP QC/QA: YES
MOLECULAR STREP A: NEGATIVE
POC MOLECULAR INFLUENZA A AGN: NEGATIVE
POC MOLECULAR INFLUENZA B AGN: NEGATIVE
SARS-COV-2 RDRP RESP QL NAA+PROBE: NEGATIVE

## 2023-11-14 PROCEDURE — 87651 POCT STREP A MOLECULAR: ICD-10-PCS | Mod: QW,,,

## 2023-11-14 PROCEDURE — 87502 POCT INFLUENZA A/B MOLECULAR: ICD-10-PCS | Mod: QW,,,

## 2023-11-14 PROCEDURE — 99204 OFFICE O/P NEW MOD 45 MIN: CPT | Mod: 25,,,

## 2023-11-14 PROCEDURE — 96372 PR INJECTION,THERAP/PROPH/DIAG2ST, IM OR SUBCUT: ICD-10-PCS | Mod: ,,,

## 2023-11-14 PROCEDURE — 87651 STREP A DNA AMP PROBE: CPT | Mod: QW,,,

## 2023-11-14 PROCEDURE — 96372 THER/PROPH/DIAG INJ SC/IM: CPT | Mod: ,,,

## 2023-11-14 PROCEDURE — 87502 INFLUENZA DNA AMP PROBE: CPT | Mod: QW,,,

## 2023-11-14 PROCEDURE — 99204 PR OFFICE/OUTPT VISIT, NEW, LEVL IV, 45-59 MIN: ICD-10-PCS | Mod: 25,,,

## 2023-11-14 PROCEDURE — 87635 SARS-COV-2 COVID-19 AMP PRB: CPT | Mod: QW,,,

## 2023-11-14 PROCEDURE — 87635: ICD-10-PCS | Mod: QW,,,

## 2023-11-14 RX ORDER — AMOXICILLIN AND CLAVULANATE POTASSIUM 875; 125 MG/1; MG/1
1 TABLET, FILM COATED ORAL EVERY 12 HOURS
Qty: 14 TABLET | Refills: 0 | Status: SHIPPED | OUTPATIENT
Start: 2023-11-14 | End: 2023-11-21

## 2023-11-14 RX ORDER — BETAMETHASONE SODIUM PHOSPHATE AND BETAMETHASONE ACETATE 3; 3 MG/ML; MG/ML
9 INJECTION, SUSPENSION INTRA-ARTICULAR; INTRALESIONAL; INTRAMUSCULAR; SOFT TISSUE
Status: COMPLETED | OUTPATIENT
Start: 2023-11-14 | End: 2023-11-14

## 2023-11-14 RX ADMIN — BETAMETHASONE SODIUM PHOSPHATE AND BETAMETHASONE ACETATE 9 MG: 3; 3 INJECTION, SUSPENSION INTRA-ARTICULAR; INTRALESIONAL; INTRAMUSCULAR; SOFT TISSUE at 02:11

## 2023-11-14 NOTE — PROGRESS NOTES
"Subjective:      Patient ID: Hilary Cadena is a 43 y.o. female.    Vitals:  height is 5' 2" (1.575 m) and weight is 77.1 kg (170 lb). Her temperature is 98.8 °F (37.1 °C). Her blood pressure is 126/85 and her pulse is 96. Her respiration is 20 and oxygen saturation is 98%.     Chief Complaint: Sinus Problem (Pt symptoms started Saturday with sinus congestion, cough, pressure, mucus, and sore throat. Both ears feel full.)    Patient is a 43-year-old female that presents complaining of a 4 day history of sinus congestion, sinus pressure, cough, bilateral ear fullness and sore throat progressively worsening and not getting better with over-the-counter medications. Patient denies any SOB, CP, rash, n/v/d, or neck stiffness.      Sinus Problem  Associated symptoms include congestion, coughing, sinus pressure and a sore throat.       HENT:  Positive for congestion, sinus pressure and sore throat.    Respiratory:  Positive for cough.       Objective:     Physical Exam   Constitutional: She is oriented to person, place, and time.  Non-toxic appearance. She does not appear ill.   HENT:   Ears:   Right Ear: External ear and ear canal normal. A middle ear effusion is present.   Left Ear: External ear and ear canal normal. A middle ear effusion is present.   Nose: Congestion present. Right sinus exhibits maxillary sinus tenderness. Left sinus exhibits maxillary sinus tenderness.   Mouth/Throat: Mucous membranes are moist. Posterior oropharyngeal erythema present. No tonsillar exudate. Oropharynx is clear.      Comments: Clear PND noted  Cardiovascular: Normal rate and normal pulses.   Pulmonary/Chest: Effort normal and breath sounds normal.   Abdominal: Normal appearance and bowel sounds are normal. Soft. There is no abdominal tenderness.   Musculoskeletal: Normal range of motion.         General: Normal range of motion.   Neurological: She is alert and oriented to person, place, and time.   Skin: Skin is warm and no rash. " Capillary refill takes less than 2 seconds.   Psychiatric: Her behavior is normal. Mood normal.       Assessment:     1. Sinusitis, unspecified chronicity, unspecified location    2. Sinus congestion           Previous History      Review of patient's allergies indicates:   Allergen Reactions    Allergen ext-aspergillus,mixed Tinitus    Cladosporium cladosporioides allergenic extract Tinitus    Dog hair standardized allergenic extract Tinitus    Grass pollen-bahia Tinitus    Grass pollen-bermuda, standard Tinitus    Mite-dermatophagoides farinae, standardized Tinitus    Mite-dermatophagoides pteronyssinus, standardized Tinitus    Stock ragweed pollen mixture Tinitus    Tree pollen-american elm Tinitus    Tree pollen-pecan Tinitus    Tree pollen-red maple Tinitus    Collins pollen-lambsquarters Tinitus    Weed pollen-rough pigweed Tinitus       Past Medical History:   Diagnosis Date    Anemia     Anxiety 6/2022    Breast cancer 2016    Left    Cancer 2/2016    Dyspareunia 2020    Estrogen receptor positive status (ER+)     Hormone disorder 2/2016    Hypertension 6/2020    Menopause 3/2016    Obesity     Osteopenia     Primary central sleep apnea     Right carpal tunnel syndrome     Vaginal dryness      Current Outpatient Medications   Medication Instructions    amoxicillin-clavulanate 875-125mg (AUGMENTIN) 875-125 mg per tablet 1 tablet, Oral, Every 12 hours    buPROPion (WELLBUTRIN XL) 150 mg, Oral, Every morning    calcium-vitamin D tablet 600 mg-200 units 1 tablet Orally daily in am    cetirizine (ZYRTEC) 10 mg, Oral, Every morning    dextroamphetamine-amphetamine (ADDERALL) 20 mg tablet 1 tablet Orally Twice a day for 90 days    doxycycline (PERIOSTAT) 20 mg, Oral, 2 times daily    exemestane (AROMASIN) 25 mg, Oral, Every morning    fluticasone propionate (FLONASE) 50 mcg/actuation nasal spray 1 spray in each nostril Nasally bid for 30 day(s)    lisinopriL-hydrochlorothiazide (PRINZIDE,ZESTORETIC) 10-12.5 mg per  "tablet 1 tablet, Oral, Every morning    multivit with minerals/lutein (MULTIVITAMIN 50 PLUS ORAL) 1 tab po Daily    NP THYROID 30 mg Tab 1 tablet, Oral, See admin instructions, One tablet by mouth early morning on empty stomach     Past Surgical History:   Procedure Laterality Date    ABDOMINAL SURGERY      ABDOMINOPLASTY  2017    Bilateral reconstructon of breast  2016    BREAST SURGERY  2017    CARPAL TUNNEL RELEASE  2018     SECTION  2014    COSMETIC SURGERY  2017    excision, tumor, soft tissue of forearm and wrist area,subcutaneous;less than 3cm  2018    mastectomy and axillary clearance  Bilateral 2016    OOPHORECTOMY Bilateral 2017    TOTAL REDUCTION MAMMOPLASTY  2017    WISDOM TOOTH EXTRACTION  1998     Family History   Problem Relation Age of Onset    Hypertension Father     Gallbladder disease Father     Hypertension Mother     Migraines Mother     Diabetes Maternal Grandfather     Alcohol abuse Maternal Grandfather     Stroke Maternal Grandfather     Cancer Maternal Grandmother     Arthritis Maternal Grandmother     Diabetes Maternal Uncle     Alcohol abuse Maternal Uncle     Migraines Sister     Migraines Sister     Stroke Paternal Aunt     Stroke Paternal Uncle        Social History     Tobacco Use    Smoking status: Former     Current packs/day: 0.00     Types: Cigarettes     Quit date: 2011     Years since quittin.8    Smokeless tobacco: Never   Substance Use Topics    Alcohol use: Yes     Comment: I dont drink weekly.    Drug use: Never        Physical Exam      Vital Signs Reviewed   /85   Pulse 96   Temp 98.8 °F (37.1 °C)   Resp 20   Ht 5' 2" (1.575 m)   Wt 77.1 kg (170 lb)   SpO2 98%   BMI 31.09 kg/m²        Procedures    Procedures     Labs     Results for orders placed or performed in visit on 23   POCT Strep A, Molecular   Result Value Ref Range    Molecular Strep A, POC Negative Negative     " Acceptable Yes    POCT COVID-19 Rapid Screening   Result Value Ref Range    POC Rapid COVID Negative Negative     Acceptable Yes    POCT Influenza A/B MOLECULAR   Result Value Ref Range    POC Molecular Influenza A Ag Negative Negative, Not Reported    POC Molecular Influenza B Ag Negative Negative, Not Reported     Acceptable Yes       Plan:       Sinusitis, unspecified chronicity, unspecified location  -     amoxicillin-clavulanate 875-125mg (AUGMENTIN) 875-125 mg per tablet; Take 1 tablet by mouth every 12 (twelve) hours. for 7 days  Dispense: 14 tablet; Refill: 0  -     betamethasone acetate-betamethasone sodium phosphate injection 9 mg    Sinus congestion  -     POCT Strep A, Molecular  -     POCT COVID-19 Rapid Screening  -     POCT Influenza A/B MOLECULAR      Take OTC cough/cold/congestion medication such as Dayquil/Nyquil.  May also take antihistamine such as Claritin/Zyrtec/Allegra.  Cepacol sore throat lozenges if needed.     Drink plenty of fluids.  Rest.      Take antibiotic until completely gone. Take with food to avoid upset stomach.

## 2023-12-24 ENCOUNTER — OFFICE VISIT (OUTPATIENT)
Dept: URGENT CARE | Facility: CLINIC | Age: 43
End: 2023-12-24
Payer: COMMERCIAL

## 2023-12-24 VITALS
BODY MASS INDEX: 31.28 KG/M2 | TEMPERATURE: 98 F | RESPIRATION RATE: 20 BRPM | HEIGHT: 62 IN | WEIGHT: 170 LBS | HEART RATE: 90 BPM | SYSTOLIC BLOOD PRESSURE: 123 MMHG | DIASTOLIC BLOOD PRESSURE: 88 MMHG | OXYGEN SATURATION: 100 %

## 2023-12-24 DIAGNOSIS — U07.1 COVID-19: Primary | ICD-10-CM

## 2023-12-24 DIAGNOSIS — U07.1 COVID-19 VIRUS DETECTED: ICD-10-CM

## 2023-12-24 LAB
CTP QC/QA: YES
CTP QC/QA: YES
POC MOLECULAR INFLUENZA A AGN: NEGATIVE
POC MOLECULAR INFLUENZA B AGN: NEGATIVE
SARS-COV-2 RDRP RESP QL NAA+PROBE: POSITIVE

## 2023-12-24 PROCEDURE — 87502 POCT INFLUENZA A/B MOLECULAR: ICD-10-PCS | Mod: QW,,,

## 2023-12-24 PROCEDURE — 99213 PR OFFICE/OUTPT VISIT, EST, LEVL III, 20-29 MIN: ICD-10-PCS | Mod: ,,,

## 2023-12-24 PROCEDURE — 99213 OFFICE O/P EST LOW 20 MIN: CPT | Mod: ,,,

## 2023-12-24 PROCEDURE — 87635: ICD-10-PCS | Mod: QW,,,

## 2023-12-24 PROCEDURE — 87635 SARS-COV-2 COVID-19 AMP PRB: CPT | Mod: QW,,,

## 2023-12-24 PROCEDURE — 87502 INFLUENZA DNA AMP PROBE: CPT | Mod: QW,,,

## 2023-12-24 RX ORDER — PROMETHAZINE HYDROCHLORIDE AND DEXTROMETHORPHAN HYDROBROMIDE 6.25; 15 MG/5ML; MG/5ML
5 SYRUP ORAL EVERY 6 HOURS PRN
Qty: 118 ML | Refills: 0 | Status: SHIPPED | OUTPATIENT
Start: 2023-12-24 | End: 2024-01-03

## 2023-12-24 NOTE — PROGRESS NOTES
"Subjective:      Patient ID: Hilary Cadena is a 43 y.o. female.    Vitals:  height is 5' 2" (1.575 m) and weight is 77.1 kg (170 lb). Her temperature is 98.3 °F (36.8 °C). Her blood pressure is 123/88 and her pulse is 90. Her respiration is 20 and oxygen saturation is 100%.     Chief Complaint: Sinus Problem (Pt symptoms started Friday with sinus congestion, pressure in head, discharge in eyes, body aches, and runny nose. )    Patient is a 43-year-old female with past medical history breast cancer in remission and is not on immunosuppressive therapy who presents to urgent care clinic with complaints of nasal congestion, rhinorrhea, sinus pressure, headache, watery drainage from the eyes over the last 2 days.  Associated body aches.  Denies known fever, neck stiffness, rash, GI symptoms, shortness of breath.  Patient has not had COVID in the past however she has been vaccinated.    Sinus Problem      ROS   Objective:     Physical Exam   Constitutional: She is oriented to person, place, and time. She appears well-developed. She is cooperative.  Non-toxic appearance. She does not appear ill. No distress.   HENT:   Head: Normocephalic and atraumatic.   Ears:   Right Ear: Hearing, tympanic membrane, external ear and ear canal normal.   Left Ear: Hearing, tympanic membrane, external ear and ear canal normal.   Nose: Rhinorrhea and congestion present. No mucosal edema or nasal deformity. No epistaxis. Right sinus exhibits no maxillary sinus tenderness and no frontal sinus tenderness. Left sinus exhibits no maxillary sinus tenderness and no frontal sinus tenderness.   Mouth/Throat: Uvula is midline, oropharynx is clear and moist and mucous membranes are normal. Mucous membranes are moist. No trismus in the jaw. Normal dentition. No uvula swelling. No oropharyngeal exudate, posterior oropharyngeal edema or posterior oropharyngeal erythema.      Comments: Postnasal drip  Eyes: Conjunctivae and lids are normal. No scleral " icterus.   Neck: Trachea normal and phonation normal. Neck supple. No edema present. No erythema present. No neck rigidity present.   Cardiovascular: Normal rate, regular rhythm, normal heart sounds and normal pulses.   Pulmonary/Chest: Effort normal and breath sounds normal. No respiratory distress. She has no decreased breath sounds. She has no rhonchi.   Abdominal: Normal appearance.   Musculoskeletal: Normal range of motion.         General: No deformity. Normal range of motion.   Lymphadenopathy:     She has no cervical adenopathy.   Neurological: She is alert and oriented to person, place, and time. She exhibits normal muscle tone. Coordination normal.   Skin: Skin is warm, dry, intact, not diaphoretic and not pale.   Psychiatric: Her speech is normal and behavior is normal. Judgment and thought content normal.   Nursing note and vitals reviewed.      Assessment:     1. COVID-19        Plan:       COVID-19  -     POCT Influenza A/B MOLECULAR  -     POCT COVID-19 Rapid Screening  -     promethazine-dextromethorphan (PROMETHAZINE-DM) 6.25-15 mg/5 mL Syrp; Take 5 mLs by mouth every 6 (six) hours as needed.  Dispense: 118 mL; Refill: 0      Discussed antiviral therapy, will hold off    Fever / Body Aches: Use OTC Tylenol or Motrin per package instructions for fever or body aches.  Sore Throat: Use OTC lozenges or throat sprays, gargle with warm salt and water, warm tea with honey.   Cough:  Prescription cough syrup nightly as it may make you drowsy  Cover your mouth with coughing, avoid sharing cups or lip balm, wash your hand before eating or touching your face.    Please quarantine for 5 days from the onset of symptoms. On day 6 of illness, you can return to work/school as long as you have not experienced fever in the last 24 hours.   Seek emergency care for breathing difficulties, chest pain, shortness of breath, or confusion. Given the nature of this disease, severe respiratory distress can develop suddenly on  day 8 or 9 of clinical course.  Follow up with you primary care doctor as needed.      Updated CDC guidelines can be found at: https://www.cdc.gov/coronavirus/2019-ncov/hcp/jgzfvz-sx-lsck.html

## 2023-12-24 NOTE — PATIENT INSTRUCTIONS
Remain home Wednesday through quarantine, may go out and about Thursday with mass  Fever / Body Aches: Use OTC Tylenol or Motrin per package instructions for fever or body aches.  Sore Throat: Use OTC lozenges or throat sprays, gargle with warm salt and water, warm tea with honey.   Cough:  Prescription cough syrup nightly as it may make you drowsy  Cover your mouth with coughing, avoid sharing cups or lip balm, wash your hand before eating or touching your face.    Please quarantine for 5 days from the onset of symptoms. On day 6 of illness, you can return to work/school as long as you have not experienced fever in the last 24 hours.   Seek emergency care for breathing difficulties, chest pain, shortness of breath, or confusion. Given the nature of this disease, severe respiratory distress can develop suddenly on day 8 or 9 of clinical course.  Follow up with you primary care doctor as needed.      Updated CDC guidelines can be found at: https://www.cdc.gov/coronavirus/2019-ncov/hcp/tdlgtd-hc-uqnv.html

## 2024-05-16 ENCOUNTER — TELEPHONE (OUTPATIENT)
Dept: HEMATOLOGY/ONCOLOGY | Facility: CLINIC | Age: 44
End: 2024-05-16
Payer: COMMERCIAL

## 2024-05-16 NOTE — TELEPHONE ENCOUNTER
Left patient detailed voice mail letting her know that Veozah is safe to take with her hx of breast cancer.

## 2024-05-16 NOTE — TELEPHONE ENCOUNTER
Patient's states PCP is prescribing Veozah for hot flashes. She would like to know if this would be okay for her to take. Please advise.

## 2024-05-16 NOTE — TELEPHONE ENCOUNTER
"We have talked about this before. It is "100% hormone-free treatment option". History of breat cancer is not listed as a contraindication to use. "

## 2024-10-10 ENCOUNTER — LAB VISIT (OUTPATIENT)
Dept: LAB | Facility: HOSPITAL | Age: 44
End: 2024-10-10
Attending: NURSE PRACTITIONER
Payer: COMMERCIAL

## 2024-10-10 DIAGNOSIS — C50.812 MALIGNANT NEOPLASM OF OVERLAPPING SITES OF LEFT BREAST IN FEMALE, ESTROGEN RECEPTOR POSITIVE: ICD-10-CM

## 2024-10-10 DIAGNOSIS — Z17.0 MALIGNANT NEOPLASM OF OVERLAPPING SITES OF LEFT BREAST IN FEMALE, ESTROGEN RECEPTOR POSITIVE: ICD-10-CM

## 2024-10-10 LAB
ALBUMIN SERPL-MCNC: 4.5 G/DL (ref 3.5–5)
ALBUMIN/GLOB SERPL: 1.6 RATIO (ref 1.1–2)
ALP SERPL-CCNC: 82 UNIT/L (ref 40–150)
ALT SERPL-CCNC: 31 UNIT/L (ref 0–55)
ANION GAP SERPL CALC-SCNC: 11 MEQ/L
AST SERPL-CCNC: 24 UNIT/L (ref 5–34)
BASOPHILS # BLD AUTO: 0.02 X10(3)/MCL
BASOPHILS NFR BLD AUTO: 0.4 %
BILIRUB SERPL-MCNC: 0.3 MG/DL
BUN SERPL-MCNC: 17.6 MG/DL (ref 7–18.7)
CALCIUM SERPL-MCNC: 9.9 MG/DL (ref 8.4–10.2)
CHLORIDE SERPL-SCNC: 104 MMOL/L (ref 98–107)
CO2 SERPL-SCNC: 27 MMOL/L (ref 22–29)
CREAT SERPL-MCNC: 0.86 MG/DL (ref 0.55–1.02)
CREAT/UREA NIT SERPL: 20
EOSINOPHIL # BLD AUTO: 0.03 X10(3)/MCL (ref 0–0.9)
EOSINOPHIL NFR BLD AUTO: 0.6 %
ERYTHROCYTE [DISTWIDTH] IN BLOOD BY AUTOMATED COUNT: 11.3 % (ref 11.5–17)
GFR SERPLBLD CREATININE-BSD FMLA CKD-EPI: >60 ML/MIN/1.73/M2
GLOBULIN SER-MCNC: 2.9 GM/DL (ref 2.4–3.5)
GLUCOSE SERPL-MCNC: 67 MG/DL (ref 74–100)
HCT VFR BLD AUTO: 39.6 % (ref 37–47)
HGB BLD-MCNC: 13.4 G/DL (ref 12–16)
IMM GRANULOCYTES # BLD AUTO: 0 X10(3)/MCL (ref 0–0.04)
IMM GRANULOCYTES NFR BLD AUTO: 0 %
LYMPHOCYTES # BLD AUTO: 1.89 X10(3)/MCL (ref 0.6–4.6)
LYMPHOCYTES NFR BLD AUTO: 38.3 %
MCH RBC QN AUTO: 29.6 PG (ref 27–31)
MCHC RBC AUTO-ENTMCNC: 33.8 G/DL (ref 33–36)
MCV RBC AUTO: 87.6 FL (ref 80–94)
MONOCYTES # BLD AUTO: 0.49 X10(3)/MCL (ref 0.1–1.3)
MONOCYTES NFR BLD AUTO: 9.9 %
NEUTROPHILS # BLD AUTO: 2.51 X10(3)/MCL (ref 2.1–9.2)
NEUTROPHILS NFR BLD AUTO: 50.8 %
PLATELET # BLD AUTO: 263 X10(3)/MCL (ref 130–400)
PMV BLD AUTO: 10 FL (ref 7.4–10.4)
POTASSIUM SERPL-SCNC: 3.7 MMOL/L (ref 3.5–5.1)
PROT SERPL-MCNC: 7.4 GM/DL (ref 6.4–8.3)
RBC # BLD AUTO: 4.52 X10(6)/MCL (ref 4.2–5.4)
SODIUM SERPL-SCNC: 142 MMOL/L (ref 136–145)
WBC # BLD AUTO: 4.94 X10(3)/MCL (ref 4.5–11.5)

## 2024-10-10 PROCEDURE — 80053 COMPREHEN METABOLIC PANEL: CPT

## 2024-10-10 PROCEDURE — 85025 COMPLETE CBC W/AUTO DIFF WBC: CPT

## 2024-10-10 PROCEDURE — 36415 COLL VENOUS BLD VENIPUNCTURE: CPT

## 2024-10-14 NOTE — PROGRESS NOTES
Subjective:       Patient ID: Hilary Cadena is a 44 y.o. female.    Surgeon: Dr. Mariano Weaver  Radiation Oncologist: Dr. Mariano Deshpande     Left Breast Cancer Clinical stage IIIA (T3(m)N1M0) diagnosed 16  Pathologic stage IIIA (T1c(m)N2aM0) s/p surgery 16  Biopsy/histology: Left breast 11:00 US core biopsy--invasive ductal carcinoma, moderately differentiated, focal DCIS, intermediate grade, ER 99.9%, FL 99.9%, Her2 0-1+ by IHC, negative by FISH, Ki67 57%, Left axilla US biopsy positive for carcinoma; right breast 1:00 US core biopsy--cyst wall measuring 0.5cm, usual ductal hyperplasia.  Surgery/pathology:   1. Bilateral mastectomies with ALND on left done 16--right breast no evidence of malignancy, left breast with infiltrating ductal carcinoma, multifocal with 4 separate tumors, largest 2cm, lymphovascular invasion present, tumor infiltrates the nipple, surgical margins free, left axillary nodes with metastatic mammary ductal carcinoma in 4/8 lymph nodes, largest deposit 3.5mm.  2. Prophylactic laparoscopic BSO done by Dr. Mccauley 17--pathology benign.  Imagin. MMG/US bilateral breast 16--11:00 left breast irregular mass with spiculated margins with associated pleomorphic calcifications measures up to 45mm, mass extends to nipple and upper outer quadrant; left node measuring 7mm noted in axillary tail with thickening of cortex, multiple other enlarged lymph nodes left axilla; 1:00 right breast 3mm oval hypoechoic mass with circumscribed margins.  2. PET/CT 3/1/16--Hypermetabolic left breast mass with 2 adjacent hypermetabolic nodes w/n left axilla, no distant metastatic disease.  3. Bilateral Breast MRI w/ and w/o contrast done 3/2/16--large irregular spiculated breast mass centered in 11:00 left breast, measures [id number], 9:00 subareolar left breast there is [id number] oval mass with irregular margins 5mm inferior to other mass; No skin thickening or nipple retraction. No  internal mammary adenopathy, several enlarged lymph nodes seen in left axilla and axillary tail. No MR evidence of malignancy in the right breast.  4. CT C/A/P done 1/19/18--No acute abnormality in C/A/P.  Post-surgical changes of breast reconstruction with mild nodularity right axilla hyperdensity possibly representing calcification, further evaluation is recommended, most likely represents scar but biopsy may be required.  5. MRI brain w/ and w/o contrast done 1/19/18--negative for any abnormality.  6. Right diagnostic MMG/US done at [facility] 1/23/18 showed no evidence of malignancy, area of concern in reconstructed right breast on chest CT corresponds to benign fat necrosis.     2DECHO 3/8/16--EF >55%     Right internal jugular mediport placed 3/10/16--removed 2/2018.     DEXA:  10/13/16--T-score AP spine -1.2, femoral neck 0.0, total hip -0.1 (osteopenia).  10/29/18--T-score AP spine -1.0, femoral neck left -0.4, right -0.3, total hip left 0.3, right 0.4 (normal).  10/30/20--T-score AP spine 0.4, femoral neck left 0.1, right 0.3, total hip left 1.2, right 1.3 (improved-normal).  11/29/22--AP spine 0.1, left femoral neck 0.1, left total 1.0, right femoral neck 0.1, total right 0.9. Normal bone density.      Treatment history:            1. Neoadjuvant dose dense Adriamycin/Cytoxan x 4 cycles completed 3/10/16--4/22/16           2. Neoadjuvant weekly Taxol completed 5/9/16--7/25/16.           3. Adjuvant radiation completed 10/12/16--11/21/16           4. Zoladex 10/10/16--5/31/17 (stopped s/p BSO 6/22/17).    5. Zometa every 6 months started 7/15/19. Stopped on 7/9/21 because there is not much literature on continuing after 5 years of diagnosis.     Treatment Plan:    Adjuvant Aromasin x 10 years started 10/10/16.    Chief Complaint: No complaints     HPI   Ms. Cadena is here today for an annual breast cancer follow-up visit.  She feels well overall.  She continues on extended endocrine therapy with  Aromasin with plans for 10 years.  She is tolerating treatment well with no new or worsening side effects.  She is taking Veozah for hot flashes with good results.  She denies any changes to her self breast exam.  She is due for a follow-up bone density exam next month.  Laboratory for this visit was unremarkable and reviewed today with the patient.  Wellness and general medical management are up to date.    Past Medical History:   Diagnosis Date    Anemia     Anxiety 6/2022    Breast cancer 2016    Left    Cancer 2/2016    Dyspareunia 2020    Estrogen receptor positive status (ER+)     Hormone disorder 2/2016    Hypertension 6/2020    Menopause 3/2016    Obesity     Osteopenia     Primary central sleep apnea     Right carpal tunnel syndrome     Vaginal dryness       Review of patient's allergies indicates:   Allergen Reactions    Allergen ext-aspergillus,mixed Tinitus    Cladosporium cladosporioides allergenic extract Tinitus    Dog hair standardized allergenic extract Tinitus    Grass pollen-bahia Tinitus    Grass pollen-bermuda, standard Tinitus    Mite-dermatophagoides farinae, standardized Tinitus    Mite-dermatophagoides pteronyssinus, standardized Tinitus    Stock ragweed pollen mixture Tinitus    Tree pollen-american elm Tinitus    Tree pollen-pecan Tinitus    Tree pollen-red maple Tinitus    [place] pollen-lambsquarters Tinitus    Weed pollen-rough pigweed Tinitus      Current Outpatient Medications on File Prior to Visit   Medication Sig Dispense Refill    calcium-vitamin D tablet 600 mg-200 units 1 tablet Orally daily in am      cetirizine (ZYRTEC) 10 MG tablet Take 10 mg by mouth every morning.      doxycycline (PERIOSTAT) 20 MG tablet Take 20 mg by mouth 2 (two) times daily.      exemestane (AROMASIN) 25 mg tablet TAKE ONE TABLET BY MOUTH IN THE MORNING 30 tablet 11    fluticasone propionate (FLONASE) 50 mcg/actuation nasal spray 1 spray in each nostril Nasally bid for 30 day(s)       lisinopriL-hydrochlorothiazide (PRINZIDE,ZESTORETIC) 10-12.5 mg per tablet Take 1 tablet by mouth every morning.      multivit with minerals/lutein (MULTIVITAMIN 50 PLUS ORAL) 1 tab po Daily      NP THYROID 30 mg Tab Take 1 tablet by mouth As instructed. One tablet by mouth early morning on empty stomach      VEOZAH 45 mg Tab 1 tablet Orally Once a day for 30 day(s)      WINLEVI 1 % Crea Apply topically.      [DISCONTINUED] buPROPion (WELLBUTRIN XL) 150 MG TB24 tablet Take 150 mg by mouth every morning.      [DISCONTINUED] dextroamphetamine-amphetamine (ADDERALL) 20 mg tablet       [DISCONTINUED] ketoconazole (NIZORAL) 2 % cream Apply topically 2 (two) times daily.      [DISCONTINUED] UNABLE TO FIND medication name: VITAMIN ADK 10,000 IU      [DISCONTINUED] UNABLE TO FIND medication name: CALCIUM + D 600-200 MG-UNIT       No current facility-administered medications on file prior to visit.      Review of Systems   Constitutional:  Negative for appetite change, fatigue, fever and unexpected weight change.   HENT:  Negative for mouth sores.    Eyes: Negative.  Negative for visual disturbance.   Respiratory:  Negative for cough and shortness of breath.    Cardiovascular:  Negative for chest pain and leg swelling.   Gastrointestinal:  Negative for abdominal distention, abdominal pain, constipation, diarrhea, nausea, vomiting and reflux.   Genitourinary:  Negative for difficulty urinating, dysuria, frequency and hematuria.   Musculoskeletal:  Negative for arthralgias and back pain.   Integumentary:  Negative for rash.   Neurological:  Negative for weakness and headaches.   Hematological:  Negative for adenopathy.   Psychiatric/Behavioral:  Negative for sleep disturbance. The patient is not nervous/anxious.          Vitals:    10/16/24 1255   BP: 119/78   Pulse: 65   Resp: 15   Temp: 97.9 °F (36.6 °C)       Weight: 82.1 kg (181 lb)   Physical Exam  Constitutional:       Appearance: Normal appearance.   HENT:      Head:  Normocephalic.      Nose: Nose normal.      Mouth/Throat:      Mouth: Mucous membranes are moist.   Eyes:      Extraocular Movements: Extraocular movements intact.      Conjunctiva/sclera: Conjunctivae normal.   Cardiovascular:      Rate and Rhythm: Normal rate and regular rhythm.   Pulmonary:      Effort: Pulmonary effort is normal.      Breath sounds: Normal breath sounds.   Chest:      Comments: S/p bilateral mastectomies with reconstruction, nipple tattoos, telangiectasias on left from radiation, no masses in either breast and no axillary adenopathy bilaterally  Abdominal:      General: Bowel sounds are normal. There is no distension.      Palpations: Abdomen is soft.      Tenderness: There is no abdominal tenderness.   Musculoskeletal:         General: Normal range of motion.   Skin:     General: Skin is warm.   Neurological:      General: No focal deficit present.      Mental Status: She is alert and oriented to person, place, and time.   Psychiatric:         Mood and Affect: Mood normal.         Judgment: Judgment normal.       Lab Visit on 10/10/2024   Component Date Value    Sodium 10/10/2024 142     Potassium 10/10/2024 3.7     Chloride 10/10/2024 104     CO2 10/10/2024 27     Glucose 10/10/2024 67 (L)     Blood Urea Nitrogen 10/10/2024 17.6     Creatinine 10/10/2024 0.86     Calcium 10/10/2024 9.9     Protein Total 10/10/2024 7.4     Albumin 10/10/2024 4.5     Globulin 10/10/2024 2.9     Albumin/Globulin Ratio 10/10/2024 1.6     Bilirubin Total 10/10/2024 0.3     ALP 10/10/2024 82     ALT 10/10/2024 31     AST 10/10/2024 24     eGFR 10/10/2024 >60     Anion Gap 10/10/2024 11.0     BUN/Creatinine Ratio 10/10/2024 20     WBC 10/10/2024 4.94     RBC 10/10/2024 4.52     Hgb 10/10/2024 13.4     Hct 10/10/2024 39.6     MCV 10/10/2024 87.6     MCH 10/10/2024 29.6     MCHC 10/10/2024 33.8     RDW 10/10/2024 11.3 (L)     Platelet 10/10/2024 263     MPV 10/10/2024 10.0     Neut % 10/10/2024 50.8     Lymph %  10/10/2024 38.3     Mono % 10/10/2024 9.9     Eos % 10/10/2024 0.6     Basophil % 10/10/2024 0.4     Lymph # 10/10/2024 1.89     Neut # 10/10/2024 2.51     Mono # 10/10/2024 0.49     Eos # 10/10/2024 0.03     Baso # 10/10/2024 0.02     IG# 10/10/2024 0.00     IG% 10/10/2024 0.0           Assessment:       1. Malignant neoplasm of overlapping sites of left breast in female, estrogen receptor positive    2. Postmenopausal    3. Aromatase inhibitor use           Plan:       Patient with locally advanced left breast cancer clinical stage IIIA (T3N1M0) tumor appeared to be multifocal diagnosed 2/2016.  Patient completed neoadjuvant chemotherapy ddAC and weekly Taxol X 12 7/25/16.  She is s/p bilateral mastecomies with left ALND on 8/31/16 and pathology showed residual disease but treatment response in primary lesion from 5.1cm down to 2cm; also with 4 lymph nodes involved but small metastatic foci.  Adjuvant radiation completed 11/21/16.     Zoladex/Aromasin started on 10/10/16.  Now s/p BSO 6/22/17 and Zoladex stopped. Aromasin only continued.  DEXA 10/13/16 showed ostopenia.  Patient was having new headaches which were concerning and also abdominal fullness & bloating, previously elevated LFTs, right upper chest pain.   CT C/A/P done 1/2018 good aside from area in right axilla may be scar tissue needs further evaluation. Right breast MMG/US confirmed that area was benign fat necrosis only.  MRI brain 1/2018 was also good.  Bilateral MMG christian done at [facility] on 8/5/19 benign. No further imaging recommended.      Patient has no concerning findings on her clinical exam or in her laboratory studies.  Continue extended endocrine therapy with Aromasin for at least 10 years.  Bone density exam is due next month.    RTC 1 year for continued breast cancer surveillance.  CBC, CMP with that visit.  All questions answered to the satisfaction of the patient.    ROSALIA NICE, FNP-C  [facility] [place]

## 2024-10-16 ENCOUNTER — OFFICE VISIT (OUTPATIENT)
Dept: HEMATOLOGY/ONCOLOGY | Facility: CLINIC | Age: 44
End: 2024-10-16
Payer: COMMERCIAL

## 2024-10-16 VITALS
DIASTOLIC BLOOD PRESSURE: 78 MMHG | WEIGHT: 181 LBS | TEMPERATURE: 98 F | BODY MASS INDEX: 33.31 KG/M2 | RESPIRATION RATE: 15 BRPM | HEIGHT: 62 IN | SYSTOLIC BLOOD PRESSURE: 119 MMHG | OXYGEN SATURATION: 99 % | HEART RATE: 65 BPM

## 2024-10-16 DIAGNOSIS — C50.812 MALIGNANT NEOPLASM OF OVERLAPPING SITES OF LEFT BREAST IN FEMALE, ESTROGEN RECEPTOR POSITIVE: Primary | ICD-10-CM

## 2024-10-16 DIAGNOSIS — Z78.0 POSTMENOPAUSAL: ICD-10-CM

## 2024-10-16 DIAGNOSIS — Z79.811 AROMATASE INHIBITOR USE: ICD-10-CM

## 2024-10-16 DIAGNOSIS — Z17.0 MALIGNANT NEOPLASM OF OVERLAPPING SITES OF LEFT BREAST IN FEMALE, ESTROGEN RECEPTOR POSITIVE: Primary | ICD-10-CM

## 2024-10-16 PROCEDURE — 4010F ACE/ARB THERAPY RXD/TAKEN: CPT | Mod: CPTII,S$GLB,, | Performed by: NURSE PRACTITIONER

## 2024-10-16 PROCEDURE — 3078F DIAST BP <80 MM HG: CPT | Mod: CPTII,S$GLB,, | Performed by: NURSE PRACTITIONER

## 2024-10-16 PROCEDURE — 1160F RVW MEDS BY RX/DR IN RCRD: CPT | Mod: CPTII,S$GLB,, | Performed by: NURSE PRACTITIONER

## 2024-10-16 PROCEDURE — 99214 OFFICE O/P EST MOD 30 MIN: CPT | Mod: S$GLB,,, | Performed by: NURSE PRACTITIONER

## 2024-10-16 PROCEDURE — 3074F SYST BP LT 130 MM HG: CPT | Mod: CPTII,S$GLB,, | Performed by: NURSE PRACTITIONER

## 2024-10-16 PROCEDURE — 1159F MED LIST DOCD IN RCRD: CPT | Mod: CPTII,S$GLB,, | Performed by: NURSE PRACTITIONER

## 2024-10-16 PROCEDURE — 3008F BODY MASS INDEX DOCD: CPT | Mod: CPTII,S$GLB,, | Performed by: NURSE PRACTITIONER

## 2024-10-16 PROCEDURE — 99999 PR PBB SHADOW E&M-EST. PATIENT-LVL IV: CPT | Mod: PBBFAC,,, | Performed by: NURSE PRACTITIONER

## 2024-10-24 DIAGNOSIS — C50.819 MALIGNANT NEOPLASM OF OVERLAPPING SITES OF UNSPECIFIED FEMALE BREAST: ICD-10-CM

## 2024-10-24 RX ORDER — EXEMESTANE 25 MG/1
25 TABLET ORAL EVERY MORNING
Qty: 30 TABLET | Refills: 11 | Status: SHIPPED | OUTPATIENT
Start: 2024-10-24

## 2024-11-26 ENCOUNTER — PATIENT MESSAGE (OUTPATIENT)
Dept: HEMATOLOGY/ONCOLOGY | Facility: CLINIC | Age: 44
End: 2024-11-26
Payer: COMMERCIAL

## 2024-11-26 ENCOUNTER — TELEPHONE (OUTPATIENT)
Dept: HEMATOLOGY/ONCOLOGY | Facility: CLINIC | Age: 44
End: 2024-11-26
Payer: COMMERCIAL

## 2024-11-26 ENCOUNTER — HOSPITAL ENCOUNTER (OUTPATIENT)
Dept: RADIOLOGY | Facility: HOSPITAL | Age: 44
Discharge: HOME | End: 2024-11-26
Attending: NURSE PRACTITIONER
Payer: COMMERCIAL

## 2024-11-26 DIAGNOSIS — Z79.811 AROMATASE INHIBITOR USE: ICD-10-CM

## 2024-11-26 DIAGNOSIS — Z78.0 POSTMENOPAUSAL: ICD-10-CM

## 2024-11-26 DIAGNOSIS — C50.819 MALIGNANT NEOPLASM OF OVERLAPPING SITES OF UNSPECIFIED FEMALE BREAST: ICD-10-CM

## 2024-11-26 PROCEDURE — 77080 DXA BONE DENSITY AXIAL: CPT | Mod: TC

## 2024-11-26 PROCEDURE — 77081 DXA BONE DENSITY APPENDICULR: CPT | Mod: TC

## 2024-11-26 RX ORDER — EXEMESTANE 25 MG/1
25 TABLET ORAL EVERY MORNING
Qty: 90 TABLET | Refills: 2 | Status: SHIPPED | OUTPATIENT
Start: 2024-11-26

## 2024-11-26 NOTE — TELEPHONE ENCOUNTER
----- Message from JOSE Saenz sent at 11/26/2024  2:10 PM CST -----  Can you let her know the DEXA scan showed continued normal bone density. It did measure her left forearm (which was not previously measured) and that was -1.1 which is right on the cusp of osteopenia. I would make sure she is taking Calcium and Vitam  in D3 and doing weight bearing exercises. We can repeat again in 2 years and go from there. Thank you!

## 2024-11-26 NOTE — PROGRESS NOTES
Can you let her know the DEXA scan showed continued normal bone density. It did measure her left forearm (which was not previously measured) and that was -1.1 which is right on the cusp of osteopenia. I would make sure she is taking Calcium and Vitamin D3 and doing weight bearing exercises. We can repeat again in 2 years and go from there. Thank you!

## 2025-02-11 ENCOUNTER — ON-DEMAND VIRTUAL (OUTPATIENT)
Dept: URGENT CARE | Facility: CLINIC | Age: 45
End: 2025-02-11
Payer: COMMERCIAL

## 2025-02-11 DIAGNOSIS — J32.9 SINUSITIS, UNSPECIFIED CHRONICITY, UNSPECIFIED LOCATION: Primary | ICD-10-CM

## 2025-02-11 RX ORDER — AZELASTINE 1 MG/ML
2 SPRAY, METERED NASAL 2 TIMES DAILY
Qty: 30 ML | Refills: 0 | Status: SHIPPED | OUTPATIENT
Start: 2025-02-11 | End: 2026-02-11

## 2025-02-11 RX ORDER — PREDNISONE 10 MG/1
10 TABLET ORAL 2 TIMES DAILY
Qty: 6 TABLET | Refills: 0 | Status: SHIPPED | OUTPATIENT
Start: 2025-02-11 | End: 2025-02-14

## 2025-02-11 RX ORDER — AMOXICILLIN AND CLAVULANATE POTASSIUM 875; 125 MG/1; MG/1
1 TABLET, FILM COATED ORAL EVERY 12 HOURS
Qty: 14 TABLET | Refills: 0 | Status: SHIPPED | OUTPATIENT
Start: 2025-02-11 | End: 2025-02-18

## 2025-02-11 NOTE — PROGRESS NOTES
Subjective:      Patient ID: Hilary Cadena is a 44 y.o. female.    Vitals:  vitals were not taken for this visit.     Chief Complaint: Sinus Problem (congestion) and Cough      Visit Type: TELE AUDIOVISUAL    Patient Location: Home     Present with the patient at the time of consultation: TELEMED PRESENT WITH PATIENT: None    Past Medical History:   Diagnosis Date    Anemia     Anxiety 2022    Breast cancer 2016    Left    Cancer 2016    Dyspareunia     Estrogen receptor positive status (ER+)     Hormone disorder 2016    Hypertension 2020    Menopause 3/2016    Obesity     Osteopenia     Primary central sleep apnea     Right carpal tunnel syndrome     Vaginal dryness      Past Surgical History:   Procedure Laterality Date    ABDOMINAL SURGERY      ABDOMINOPLASTY  2017    Bilateral reconstructon of breast  2016    BREAST SURGERY  2017    CARPAL TUNNEL RELEASE  2018     SECTION  2014    COSMETIC SURGERY  2017    excision, tumor, soft tissue of forearm and wrist area,subcutaneous;less than 3cm  2018    mastectomy and axillary clearance  Bilateral 2016    OOPHORECTOMY Bilateral 2017    TOTAL REDUCTION MAMMOPLASTY  2017    WISDOM TOOTH EXTRACTION  1998     Review of patient's allergies indicates:   Allergen Reactions    Allergen ext-aspergillus,mixed Tinitus    Cladosporium cladosporioides allergenic extract Tinitus    Dog hair standardized allergenic extract Tinitus    Grass pollen-bahia Tinitus    Grass pollen-bermuda, standard Tinitus    Mite-dermatophagoides farinae, standardized Tinitus    Mite-dermatophagoides pteronyssinus, standardized Tinitus    Stock ragweed pollen mixture Tinitus    Tree pollen-american elm Tinitus    Tree pollen-pecan Tinitus    Tree pollen-red maple Tinitus    [place] pollen-lambsquarters Tinitus    Weed pollen-rough pigweed Tinitus     Current Outpatient Medications on File Prior to Visit   Medication Sig Dispense Refill     calcium-vitamin D tablet 600 mg-200 units 1 tablet Orally daily in am      cetirizine (ZYRTEC) 10 MG tablet Take 10 mg by mouth every morning.      exemestane (AROMASIN) 25 mg tablet Take 1 tablet (25 mg total) by mouth every morning. 90 tablet 2    fluticasone propionate (FLONASE) 50 mcg/actuation nasal spray 1 spray in each nostril Nasally bid for 30 day(s)      lisinopriL-hydrochlorothiazide (PRINZIDE,ZESTORETIC) 10-12.5 mg per tablet Take 1 tablet by mouth every morning.      multivit with minerals/lutein (MULTIVITAMIN 50 PLUS ORAL) 1 tab po Daily      NP THYROID 30 mg Tab Take 1 tablet by mouth As instructed. One tablet by mouth early morning on empty stomach      VEOZAH 45 mg Tab 1 tablet Orally Once a day for 30 day(s)      [DISCONTINUED] doxycycline (PERIOSTAT) 20 MG tablet Take 20 mg by mouth 2 (two) times daily.      [DISCONTINUED] WINLEVI 1 % Crea Apply topically.       No current facility-administered medications on file prior to visit.     Family History   Problem Relation Name Age of Onset    Hypertension Father Leroy Mace Jr.     Gallbladder disease Father Leroy Mace Jr.     Hypertension Mother Ana Saucedo     Migraines Mother Ana Saucedo     Diabetes Maternal Grandfather Natalia Terrie Sr.     Alcohol abuse Maternal Grandfather Natalia Terrie Sr.     Stroke Maternal Grandfather Natalia Terrie Sr.     Cancer Maternal Grandmother Glenn Terrie     Arthritis Maternal Grandmother Glenn Terrie     Diabetes Maternal Uncle Joaquín Falcon Jr.     Alcohol abuse Maternal Uncle Joaquín Falcon Jr.     Migraines Sister Koki Gallagher     Migraines Sister Candelaria Choudhury     Stroke Paternal Aunt Jo Mace     Stroke Paternal Uncle Kory Mace        Medications Ordered                [id number] [address] [place] [address] AT [address] [address]    Telephone: [phone]   Fax: [phone]   Hours: Not open 24 hours                          E-Prescribed (3 of 3)              amoxicillin-clavulanate 875-125mg (AUGMENTIN) 875-125 mg per tablet    Sig: Take 1 tablet by mouth every 12 (twelve) hours. for 7 days       Start: 25     Quantity: 14 tablet Refills: 0                         azelastine (ASTELIN) 137 mcg (0.1 %) nasal spray    Si sprays (274 mcg total) by Nasal route 2 (two) times daily.       Start: 25     Quantity: 30 mL Refills: 0                         predniSONE (DELTASONE) 10 MG tablet    Sig: Take 1 tablet (10 mg total) by mouth 2 (two) times daily. for 3 days       Start: 25     Quantity: 6 tablet Refills: 0                           Ohs Peq Odvv Intake    2025  9:46 AM CST - Filed by Patient   What is your current physical address in the event of a medical emergency? [address] [address]   Are you able to take your vital signs? Yes   Systolic Blood Pressure: 111   Diastolic Blood Pressure: 74   Weight: 175   Height: 62   Pulse: 61   Temperature: 97.9   Respiration rate:    Pulse Oxygen:    Please attach any relevant images or files    Is your employer contracted with [facility]? No         Onset of URI symptoms for 4 days, worsening. Hx of sinus infections with similar presentation. Using  Mucinex, flonase(caused nosebleeds) and sinus meds with some relief. Seeking further treatment options.    Sinus Problem  Associated symptoms include congestion, coughing, headaches and sinus pressure. Pertinent negatives include no ear pain or sore throat.   Cough  Associated symptoms include headaches. Pertinent negatives include no ear pain, fever or sore throat.       Constitution: Negative for fever.   HENT:  Positive for congestion, nosebleeds, sinus pain and sinus pressure. Negative for ear pain and sore throat.    Respiratory:  Positive for cough.    Gastrointestinal:  Negative for nausea, vomiting and diarrhea.   Musculoskeletal:  Positive for back pain (upper back with cough).    Allergic/Immunologic: Positive for recurrent sinus infections.   Neurological:  Positive for headaches.        Objective:   The physical exam was conducted virtually.  Physical Exam   Constitutional: She is oriented to person, place, and time. She does not appear ill. No distress.   HENT:   Head: Normocephalic and atraumatic.   Nose: Right sinus exhibits frontal sinus tenderness. Left sinus exhibits frontal sinus tenderness.   Eyes: Extraocular movement intact   Pulmonary/Chest: Effort normal.   Abdominal: Normal appearance.   Musculoskeletal: Normal range of motion.         General: Normal range of motion.   Neurological: no focal deficit. She is alert and oriented to person, place, and time.   Psychiatric: Her behavior is normal. Mood normal.   Vitals reviewed.      Assessment:     1. Sinusitis, unspecified chronicity, unspecified location        Plan:   Start with steroid and over the counter medications first. If symptoms persist or continue to worsen over the week then begin the antibiotic as prescribed.    Patient encouraged to monitor symptoms closely and instructed to follow-up for new or worsening symptoms. Further, in-person, evaluation may be necessary for continued treatment. Please follow up with your primary care doctor or specialist as needed. Verbally discussed plan. Patient confirms understanding and is in agreement with treatment and plan.     You must understand that you've received a Virtual Care evaluation only and that you may be released before all your medical problems are known or treated. You, the patient, will arrange for follow up care as instructed.      Sinusitis, unspecified chronicity, unspecified location  -     predniSONE (DELTASONE) 10 MG tablet; Take 1 tablet (10 mg total) by mouth 2 (two) times daily. for 3 days  Dispense: 6 tablet; Refill: 0  -     azelastine (ASTELIN) 137 mcg (0.1 %) nasal spray; 2 sprays (274 mcg total) by Nasal route 2 (two) times daily.  Dispense: 30 mL;  Refill: 0  -     amoxicillin-clavulanate 875-125mg (AUGMENTIN) 875-125 mg per tablet; Take 1 tablet by mouth every 12 (twelve) hours. for 7 days  Dispense: 14 tablet; Refill: 0      Patient Instructions   OVER THE COUNTER RECOMMENDATIONS/SUGGESTIONS (IF NO CONTRAINDICATIONS).     ·         Make sure to stay well hydrated.     ·         Use Nasal Saline to mechanically move any post nasal drip from your eustachian tube or from the back of your throat.     ·         Use warm saltwater gargles to ease your throat pain. Warm saltwater gargles as needed for sore throat-  1/2 tsp salt to 1 cup warm water, gargle as desired. Warm fluids tend to relieve a sore throat.     .         Throat lozenges, Chloraseptic spray or other over the counter treatments are ok to use as well. Use as directed.     ·         Use an antihistamine such as Claritin, Zyrtec or Allegra to dry you out.     ·         Use pseudoephedrine (behind the counter) to decongest. Pseudoephedrine  30 mg up to 240 mg /day. It can raise your blood pressure and give you palpitations.     ·         Use Mucinex (guaifenesin) to break up mucous up to 2400mg/day to loosen any mucous.     ·         The Mucinex DM pill has a cough suppressant that can be sedating. It can be used at night to stop the tickle at the back of your throat.     ·         You can use Mucinex D (it has guaifenesin and a high dose of pseudoephedrine) in the mornings to help decongest.     ·         Use Afrin (oxymetazoline) in each nare for no longer than 3 days, as it is addictive. It can also dry out your mucous membranes and cause elevated blood pressure. This is especially useful if you are flying.     ·         Use Flonase 1-2 sprays/nostril per day. It is a local acting steroid nasal spray, if you develop a bloody nose, stop using the medication immediately.     ·         Sometimes Nyquil at night is beneficial to help you get some rest, however it is sedating, and it does have an  antihistamine, and Tylenol.     ·         Honey is a natural cough suppressant that can be used.     ·         Tylenol up to 4,000 mg a day is safe for short periods and can be used for body aches, pain, and fever. However, in high doses and prolonged use it can cause liver irritation.     ·         Ibuprofen is a non-steroidal anti-inflammatory that can be used for body aches, pain, and fever. However, it can also cause stomach irritation if overused.

## 2025-07-12 DIAGNOSIS — C50.819 MALIGNANT NEOPLASM OF OVERLAPPING SITES OF UNSPECIFIED FEMALE BREAST: ICD-10-CM

## 2025-07-14 RX ORDER — EXEMESTANE 25 MG/1
25 TABLET ORAL EVERY MORNING
Qty: 90 TABLET | Refills: 0 | Status: SHIPPED | OUTPATIENT
Start: 2025-07-14